# Patient Record
Sex: MALE | Race: WHITE | NOT HISPANIC OR LATINO | Employment: OTHER | URBAN - METROPOLITAN AREA
[De-identification: names, ages, dates, MRNs, and addresses within clinical notes are randomized per-mention and may not be internally consistent; named-entity substitution may affect disease eponyms.]

---

## 2017-01-17 ENCOUNTER — GENERIC CONVERSION - ENCOUNTER (OUTPATIENT)
Dept: OTHER | Facility: OTHER | Age: 62
End: 2017-01-17

## 2017-01-17 ENCOUNTER — ALLSCRIPTS OFFICE VISIT (OUTPATIENT)
Dept: OTHER | Facility: OTHER | Age: 62
End: 2017-01-17

## 2017-01-17 ENCOUNTER — ALLSCRIPTS OFFICE VISIT (OUTPATIENT)
Dept: RADIOLOGY | Facility: CLINIC | Age: 62
End: 2017-01-17
Payer: OTHER MISCELLANEOUS

## 2017-01-17 ENCOUNTER — TRANSCRIBE ORDERS (OUTPATIENT)
Dept: LAB | Facility: CLINIC | Age: 62
End: 2017-01-17

## 2017-01-17 ENCOUNTER — APPOINTMENT (OUTPATIENT)
Dept: LAB | Facility: CLINIC | Age: 62
End: 2017-01-17
Payer: MEDICARE

## 2017-01-17 DIAGNOSIS — F41.9 ANXIETY DISORDER: ICD-10-CM

## 2017-01-17 DIAGNOSIS — B34.9 VIRAL INFECTION: ICD-10-CM

## 2017-01-17 LAB
ALBUMIN SERPL BCP-MCNC: 4 G/DL (ref 3.5–5)
ALP SERPL-CCNC: 63 U/L (ref 46–116)
ALT SERPL W P-5'-P-CCNC: 35 U/L (ref 12–78)
ANION GAP SERPL CALCULATED.3IONS-SCNC: 4 MMOL/L (ref 4–13)
AST SERPL W P-5'-P-CCNC: 22 U/L (ref 5–45)
BASOPHILS # BLD AUTO: 0.02 THOUSANDS/ΜL (ref 0–0.1)
BASOPHILS NFR BLD AUTO: 0 % (ref 0–1)
BILIRUB SERPL-MCNC: 1.01 MG/DL (ref 0.2–1)
BILIRUB UR QL STRIP: NEGATIVE
BUN SERPL-MCNC: 13 MG/DL (ref 5–25)
CALCIUM SERPL-MCNC: 9 MG/DL (ref 8.3–10.1)
CHLORIDE SERPL-SCNC: 100 MMOL/L (ref 100–108)
CLARITY UR: CLEAR
CO2 SERPL-SCNC: 33 MMOL/L (ref 21–32)
COLOR UR: YELLOW
CREAT SERPL-MCNC: 0.92 MG/DL (ref 0.6–1.3)
EOSINOPHIL # BLD AUTO: 0.04 THOUSAND/ΜL (ref 0–0.61)
EOSINOPHIL NFR BLD AUTO: 1 % (ref 0–6)
ERYTHROCYTE [DISTWIDTH] IN BLOOD BY AUTOMATED COUNT: 13.3 % (ref 11.6–15.1)
GFR SERPL CREATININE-BSD FRML MDRD: >60 ML/MIN/1.73SQ M
GLUCOSE SERPL-MCNC: 97 MG/DL (ref 65–140)
GLUCOSE UR STRIP-MCNC: NEGATIVE MG/DL
HCT VFR BLD AUTO: 50.2 % (ref 36.5–49.3)
HGB BLD-MCNC: 17.6 G/DL (ref 12–17)
HGB UR QL STRIP.AUTO: NEGATIVE
KETONES UR STRIP-MCNC: NEGATIVE MG/DL
LEUKOCYTE ESTERASE UR QL STRIP: NEGATIVE
LYMPHOCYTES # BLD AUTO: 0.95 THOUSANDS/ΜL (ref 0.6–4.47)
LYMPHOCYTES NFR BLD AUTO: 21 % (ref 14–44)
MCH RBC QN AUTO: 30.7 PG (ref 26.8–34.3)
MCHC RBC AUTO-ENTMCNC: 35.1 G/DL (ref 31.4–37.4)
MCV RBC AUTO: 88 FL (ref 82–98)
MONOCYTES # BLD AUTO: 0.37 THOUSAND/ΜL (ref 0.17–1.22)
MONOCYTES NFR BLD AUTO: 8 % (ref 4–12)
NEUTROPHILS # BLD AUTO: 3.15 THOUSANDS/ΜL (ref 1.85–7.62)
NEUTS SEG NFR BLD AUTO: 70 % (ref 43–75)
NITRITE UR QL STRIP: NEGATIVE
NRBC BLD AUTO-RTO: 0 /100 WBCS
PH UR STRIP.AUTO: 6.5 [PH] (ref 4.5–8)
PLATELET # BLD AUTO: 196 THOUSANDS/UL (ref 149–390)
PMV BLD AUTO: 10.8 FL (ref 8.9–12.7)
POTASSIUM SERPL-SCNC: 4 MMOL/L (ref 3.5–5.3)
PROT SERPL-MCNC: 7.5 G/DL (ref 6.4–8.2)
PROT UR STRIP-MCNC: NEGATIVE MG/DL
RBC # BLD AUTO: 5.73 MILLION/UL (ref 3.88–5.62)
SODIUM SERPL-SCNC: 137 MMOL/L (ref 136–145)
SP GR UR STRIP.AUTO: 1.01 (ref 1–1.03)
TSH SERPL DL<=0.05 MIU/L-ACNC: 0.65 UIU/ML (ref 0.36–3.74)
UROBILINOGEN UR QL STRIP.AUTO: 0.2 E.U./DL
WBC # BLD AUTO: 4.53 THOUSAND/UL (ref 4.31–10.16)

## 2017-01-17 PROCEDURE — 85025 COMPLETE CBC W/AUTO DIFF WBC: CPT

## 2017-01-17 PROCEDURE — 80053 COMPREHEN METABOLIC PANEL: CPT

## 2017-01-17 PROCEDURE — 81003 URINALYSIS AUTO W/O SCOPE: CPT

## 2017-01-17 PROCEDURE — 84443 ASSAY THYROID STIM HORMONE: CPT

## 2017-01-17 PROCEDURE — 36415 COLL VENOUS BLD VENIPUNCTURE: CPT

## 2017-02-06 ENCOUNTER — GENERIC CONVERSION - ENCOUNTER (OUTPATIENT)
Dept: OTHER | Facility: OTHER | Age: 62
End: 2017-02-06

## 2017-02-07 ENCOUNTER — GENERIC CONVERSION - ENCOUNTER (OUTPATIENT)
Dept: OTHER | Facility: OTHER | Age: 62
End: 2017-02-07

## 2017-02-14 ENCOUNTER — GENERIC CONVERSION - ENCOUNTER (OUTPATIENT)
Dept: OTHER | Facility: OTHER | Age: 62
End: 2017-02-14

## 2017-02-17 ENCOUNTER — GENERIC CONVERSION - ENCOUNTER (OUTPATIENT)
Dept: OTHER | Facility: OTHER | Age: 62
End: 2017-02-17

## 2017-03-09 ENCOUNTER — ALLSCRIPTS OFFICE VISIT (OUTPATIENT)
Dept: RADIOLOGY | Facility: CLINIC | Age: 62
End: 2017-03-09
Payer: OTHER MISCELLANEOUS

## 2017-03-10 ENCOUNTER — GENERIC CONVERSION - ENCOUNTER (OUTPATIENT)
Dept: OTHER | Facility: OTHER | Age: 62
End: 2017-03-10

## 2017-03-23 ENCOUNTER — ALLSCRIPTS OFFICE VISIT (OUTPATIENT)
Dept: RADIOLOGY | Facility: CLINIC | Age: 62
End: 2017-03-23
Payer: OTHER MISCELLANEOUS

## 2017-03-24 ENCOUNTER — GENERIC CONVERSION - ENCOUNTER (OUTPATIENT)
Dept: OTHER | Facility: OTHER | Age: 62
End: 2017-03-24

## 2017-04-19 ENCOUNTER — ALLSCRIPTS OFFICE VISIT (OUTPATIENT)
Dept: OTHER | Facility: OTHER | Age: 62
End: 2017-04-19

## 2017-04-28 DIAGNOSIS — M12.88 OTHER SPECIFIC ARTHROPATHIES, NOT ELSEWHERE CLASSIFIED, OTHER SPECIFIED SITE: ICD-10-CM

## 2017-04-28 DIAGNOSIS — M54.16 RADICULOPATHY OF LUMBAR REGION: ICD-10-CM

## 2017-04-28 DIAGNOSIS — M54.50 LOW BACK PAIN: ICD-10-CM

## 2017-04-28 DIAGNOSIS — M25.562 PAIN IN LEFT KNEE: ICD-10-CM

## 2017-04-28 DIAGNOSIS — M54.6 PAIN IN THORACIC SPINE: ICD-10-CM

## 2017-05-05 ENCOUNTER — ALLSCRIPTS OFFICE VISIT (OUTPATIENT)
Dept: OTHER | Facility: OTHER | Age: 62
End: 2017-05-05

## 2017-05-13 ENCOUNTER — TRANSCRIBE ORDERS (OUTPATIENT)
Dept: ADMINISTRATIVE | Facility: HOSPITAL | Age: 62
End: 2017-05-13

## 2017-05-13 DIAGNOSIS — M54.16 LUMBAR RADICULOPATHY: ICD-10-CM

## 2017-05-13 DIAGNOSIS — M12.88 TRANSIENT ARTHROPATHY, OTHER SPECIFIED SITE: Primary | ICD-10-CM

## 2017-05-19 ENCOUNTER — HOSPITAL ENCOUNTER (OUTPATIENT)
Dept: RADIOLOGY | Facility: CLINIC | Age: 62
Discharge: HOME | End: 2017-05-19
Payer: MEDICARE

## 2017-05-19 ENCOUNTER — HOSPITAL ENCOUNTER (OUTPATIENT)
Dept: CT IMAGING | Facility: CLINIC | Age: 62
Discharge: HOME | End: 2017-05-19
Payer: MEDICARE

## 2017-05-19 DIAGNOSIS — M54.6 PAIN IN THORACIC SPINE: ICD-10-CM

## 2017-05-19 DIAGNOSIS — M12.88 TRANSIENT ARTHROPATHY, OTHER SPECIFIED SITE: ICD-10-CM

## 2017-05-19 DIAGNOSIS — M54.16 LUMBAR RADICULOPATHY: ICD-10-CM

## 2017-05-19 DIAGNOSIS — M25.562 PAIN IN LEFT KNEE: ICD-10-CM

## 2017-05-19 PROCEDURE — 73564 X-RAY EXAM KNEE 4 OR MORE: CPT

## 2017-05-19 PROCEDURE — 72131 CT LUMBAR SPINE W/O DYE: CPT

## 2017-05-19 PROCEDURE — 72128 CT CHEST SPINE W/O DYE: CPT

## 2017-06-12 ENCOUNTER — APPOINTMENT (OUTPATIENT)
Dept: LAB | Facility: CLINIC | Age: 62
End: 2017-06-12
Payer: MEDICARE

## 2017-06-12 ENCOUNTER — TRANSCRIBE ORDERS (OUTPATIENT)
Dept: LAB | Facility: CLINIC | Age: 62
End: 2017-06-12

## 2017-06-12 DIAGNOSIS — A69.20 LYME DISEASE: Primary | ICD-10-CM

## 2017-06-12 DIAGNOSIS — R53.83 FATIGUE DUE TO DEPRESSION: ICD-10-CM

## 2017-06-12 DIAGNOSIS — F32.A FATIGUE DUE TO DEPRESSION: ICD-10-CM

## 2017-06-12 LAB
BASOPHILS # BLD AUTO: 0.02 THOUSANDS/ΜL (ref 0–0.1)
BASOPHILS NFR BLD AUTO: 1 % (ref 0–1)
EOSINOPHIL # BLD AUTO: 0.04 THOUSAND/ΜL (ref 0–0.61)
EOSINOPHIL NFR BLD AUTO: 1 % (ref 0–6)
ERYTHROCYTE [DISTWIDTH] IN BLOOD BY AUTOMATED COUNT: 13.5 % (ref 11.6–15.1)
HCT VFR BLD AUTO: 47.9 % (ref 36.5–49.3)
HGB BLD-MCNC: 16.3 G/DL (ref 12–17)
LYMPHOCYTES # BLD AUTO: 1.03 THOUSANDS/ΜL (ref 0.6–4.47)
LYMPHOCYTES NFR BLD AUTO: 26 % (ref 14–44)
MCH RBC QN AUTO: 30 PG (ref 26.8–34.3)
MCHC RBC AUTO-ENTMCNC: 34 G/DL (ref 31.4–37.4)
MCV RBC AUTO: 88 FL (ref 82–98)
MONOCYTES # BLD AUTO: 0.32 THOUSAND/ΜL (ref 0.17–1.22)
MONOCYTES NFR BLD AUTO: 8 % (ref 4–12)
NEUTROPHILS # BLD AUTO: 2.52 THOUSANDS/ΜL (ref 1.85–7.62)
NEUTS SEG NFR BLD AUTO: 64 % (ref 43–75)
NRBC BLD AUTO-RTO: 0 /100 WBCS
PLATELET # BLD AUTO: 166 THOUSANDS/UL (ref 149–390)
PMV BLD AUTO: 10.8 FL (ref 8.9–12.7)
RBC # BLD AUTO: 5.43 MILLION/UL (ref 3.88–5.62)
WBC # BLD AUTO: 3.93 THOUSAND/UL (ref 4.31–10.16)

## 2017-06-12 PROCEDURE — 36415 COLL VENOUS BLD VENIPUNCTURE: CPT

## 2017-06-12 PROCEDURE — 85025 COMPLETE CBC W/AUTO DIFF WBC: CPT

## 2017-06-12 PROCEDURE — 86618 LYME DISEASE ANTIBODY: CPT

## 2017-06-14 LAB
B BURGDOR IGG SER IA-ACNC: 0.1
B BURGDOR IGM SER IA-ACNC: 0.13

## 2017-06-19 ENCOUNTER — ALLSCRIPTS OFFICE VISIT (OUTPATIENT)
Dept: OTHER | Facility: OTHER | Age: 62
End: 2017-06-19

## 2017-06-19 DIAGNOSIS — M54.16 RADICULOPATHY OF LUMBAR REGION: ICD-10-CM

## 2017-06-19 DIAGNOSIS — M12.88 OTHER SPECIFIC ARTHROPATHIES, NOT ELSEWHERE CLASSIFIED, OTHER SPECIFIED SITE: ICD-10-CM

## 2017-06-19 DIAGNOSIS — M54.6 PAIN IN THORACIC SPINE: ICD-10-CM

## 2017-06-19 DIAGNOSIS — S16.1XXA STRAIN OF MUSCLE, FASCIA AND TENDON AT NECK LEVEL, INITIAL ENCOUNTER: ICD-10-CM

## 2017-07-11 ENCOUNTER — GENERIC CONVERSION - ENCOUNTER (OUTPATIENT)
Dept: OTHER | Facility: OTHER | Age: 62
End: 2017-07-11

## 2017-07-18 ENCOUNTER — GENERIC CONVERSION - ENCOUNTER (OUTPATIENT)
Dept: OTHER | Facility: OTHER | Age: 62
End: 2017-07-18

## 2017-07-19 ENCOUNTER — GENERIC CONVERSION - ENCOUNTER (OUTPATIENT)
Dept: OTHER | Facility: OTHER | Age: 62
End: 2017-07-19

## 2017-08-11 ENCOUNTER — GENERIC CONVERSION - ENCOUNTER (OUTPATIENT)
Dept: OTHER | Facility: OTHER | Age: 62
End: 2017-08-11

## 2017-09-14 ENCOUNTER — ALLSCRIPTS OFFICE VISIT (OUTPATIENT)
Dept: OTHER | Facility: OTHER | Age: 62
End: 2017-09-14

## 2017-09-21 DIAGNOSIS — M54.16 RADICULOPATHY OF LUMBAR REGION: ICD-10-CM

## 2017-09-21 DIAGNOSIS — M12.88 OTHER SPECIFIC ARTHROPATHIES, NOT ELSEWHERE CLASSIFIED, OTHER SPECIFIED SITE: ICD-10-CM

## 2017-09-28 ENCOUNTER — GENERIC CONVERSION - ENCOUNTER (OUTPATIENT)
Dept: OTHER | Facility: OTHER | Age: 62
End: 2017-09-28

## 2018-01-08 ENCOUNTER — GENERIC CONVERSION - ENCOUNTER (OUTPATIENT)
Dept: INTERNAL MEDICINE CLINIC | Facility: CLINIC | Age: 63
End: 2018-01-08

## 2018-01-10 NOTE — MISCELLANEOUS
Message   Recorded as Task   Date: 07/14/2017 03:15 PM, Created By: Caleb Spivey   Task Name: Miscellaneous   Assigned To: [facility] clinical,Team   Regarding Patient: Rajinder Huff, Status: In Progress   Comment:    Aubree Alexander - 14 Jul 2017 3:15 PM     TASK CREATED  phone call from patient requesting that additional visits be approved for his PT, patient states [facility] sent over a request  please call patient at [phone]  Renetta Alford - 14 Jul 2017 3:55 PM     TASK REASSIGNED: Previously Assigned To [facility] clinical,Team  Please advise  Huong Fountain - 17 Jul 2017 8:29 PM     TASK REPLIED TO: Previously Assigned To Huong Fountain  can we please re-fax another script for PT   Renetta Alford - 18 Jul 2017 8:12 AM     TASK EDITED  Can you put the PT order in allscripts for me so I can fax it  Huong Fountain - 18 Jul 2017 2:01 PM     TASK REPLIED TO: Previously Assigned To Huong Fountain  order in system thank you  Renetta Alford - 18 Jul 2017 3:01 PM     TASK IN PROGRESS   Renetta Alford - 18 Jul 2017 3:09 PM     TASK EDITED  S/W pt and informed him we will be faxing PT order to [facility] today  Pt provided ph # for [facility], [phone]  PT order faxed to [facility] at [phone]  Active Problems    1  Acute sinusitis (461 9) (J01 90)   2  Anxiety (300 00) (F41 9)   3  Backache (724 5) (M54 9)   4  Cerumen impaction (380 4) (H61 20)   5  Cervical strain (847 0) (S16 1XXA)   6  Chronic bilateral thoracic back pain (724 1,338 29) (M54 6,G89 29)   7  Chronic pain syndrome (338 4) (G89 4)   8  Colon cancer screening (V76 51) (Z12 11)   9  Cyst   10  Disc degeneration, lumbar (722 52) (M51 36)   11  Dysuria (788 1) (R30 0)   12  Encounter for prostate cancer screening (V76 44) (Z12 5)   13  Encounter for removal of staples (V58 32) (Z48 02)   14  Encounter for screening for HIV (V73 89) (Z11 4)   15   Failed back syndrome (288 51) (M96 1)   16  Fatigue (780 79) (R53 83)   17  Flu vaccine need (V04 81) (Z23)   18  Left knee pain (719 46) (M25 562)   19  Limb pain (729 5) (M79 609)   20  Lumbago (724 2) (M54 5)   21  Lumbar facet arthropathy (721 3) (M12 88)   22  Lumbar radiculopathy (724 4) (M54 16)   23  Lyme disease (088 81) (A69 20)   24  Need for zoster vaccine (V04 89) (Z23)   25  Other specified aftercare following surgery (V58 49) (Z48 89)   26  Pain syndrome, chronic (338 4) (G89 4)   27  Postprocedural state (V45 89) (Z98 890)   28  Pre-operative cardiovascular examination (V72 81) (Z01 810)   29  Preoperative clearance (V72 84) (Z01 818)   30  Pre-operative laboratory examination (V72 63) (Z01 812)   31  Pre-procedural examination (V72 84) (Z01 818)   32  Right knee pain (719 46) (M25 561)   33  Screening for cardiovascular condition (V81 2) (Z13 6)   34  Screening for diabetes mellitus (V77 1) (Z13 1)   35  Secondary polycythemia (289 0) (D75 1)   36  Skin rash (782 1) (R21)   37  Spondylosis of lumbar region without myelopathy or radiculopathy (721 3) (M47 816)   38  Testicular hypogonadism (257 2) (E29 1)   39  Urine frequency (788 41) (R35 0)   40  Viral illness (079 99) (B34 9)    Current Meds   1  ALPRAZolam 1 MG Oral Tablet; TAKE 1 TABLET 3 times daily PRN; Therapy: 03KFA9005 to Recorded   2  Aspir-Low 81 MG Oral Tablet Delayed Release; TAKE 1 TABLET DAILY; Therapy: (Recorded:12Zau8487) to Recorded   3  Back Support M/L Miscellaneous; Therapy: (Recorded:25Yuq6279) to Recorded   4  BuPROPion HCl ER (SR) 100 MG Oral Tablet Extended Release 12 Hour; TAKE 3   TABLET Every morning; Therapy: 31TKN8091 to (Evaluate:29Nov2014) Recorded   5  Fish Oil 1000 MG Oral Capsule; TAKE 2 CAPSULE Daily; Therapy: (Recorded:12Nov2015) to Recorded   6  MS Contin 100 MG Oral Tablet Extended Release (Morphine Sulfate ER); 2 tabs in AM   and 2 tabs at Bedtime; Therapy: (Recorded:14Apr2016) to Recorded   7   Roxicodone 30 MG Oral Tablet (OxyCODONE HCl); Take 1 tablet 3-4 times daily; Therapy: (Recorded:14Ckh1651) to Recorded   8  SM Vitamin C 500 MG Oral Tablet; Therapy: (Recorded:51Irk6343) to Recorded   9  Testosterone Cypionate 200 MG/ML Intramuscular Solution; 0 5 mL every 7 days; Therapy: (Recorded:85Qqe5203) to Recorded   10  TiZANidine HCl - 4 MG Oral Tablet; take 1 tablet daily prn; Therapy: 07WKM7720 to (Evaluate:36Bsi8043) Recorded   11  Viagra 100 MG Oral Tablet; TAKE 1 TABLET DAILY 1 HOUR BEFORE NEEDED; Therapy: 09PPE1193 to Recorded   12  Zostavax 60771 UNT/0 65ML Subcutaneous Solution Reconstituted (Zostavax 69412    UNT/0 65ML Subcutaneous Solution Reconstituted); Inject one dose; Therapy: 93EKF3758 to (Last Rx:18Nov2016)  Requested for: 07HJB8929 Ordered    Allergies    1  Lyrica CAPS    2  Nuts   3  Animal dander - Cats   4  Dust   5   Mold    Signatures   Electronically signed by : Lang Schirmer, ; Jul 18 2017  3:09PM EST                       (Author)

## 2018-01-10 NOTE — MISCELLANEOUS
Message  As requested by the patient, gave a prescription for physical therapy        Plan  Cervical strain, Failed back syndrome    · *1 - [facility]  Consult  Status: Hold For - Scheduling   Requested for: 25UHK1565  Care Summary provided  : Yes    Signatures   Electronically signed by : Greyson Crook MD; Gil  3 2016  4:25PM EST                       (Author)

## 2018-01-10 NOTE — RESULT NOTES
Message   Recorded as Task   Date: 03/24/2017 10:53 AM, Created By: Cait Garces   Task Name: Follow Up   Assigned To: Yuliya Jacobsen   Regarding Patient: Tabby LI, Status: Active   Comment:    Yuliya Jacobsen - 24 Mar 2017 10:53 AM     TASK CREATED  F/u procedure-R L4-L5 RFA on 3/23/17  Pt had a "great" night and feels "wonderful" today  Only rates his back pain as a 1-2/10  Site is clean and dry with no s/s of infection  No fever  No sunburn like sensation  Already has a 4 wk f/u and will call if he develops any problems     Gustavo Mcclain - 24 Mar 2017 11:18 AM     TASK REPLIED TO: Previously Assigned To Gustavo monae md aware        Signatures   Electronically signed by : Jasen Zafar, ; Mar 24 2017  1:34PM EST                       (Author)

## 2018-01-10 NOTE — MISCELLANEOUS
Message   Recorded as Task   Date: 03/10/2017 12:37 PM, Created By: Caleb Figueroa   Task Name: Follow Up   Assigned To: Yuliya Jacobsen   Regarding Patient: Robert LI, Status: Active   Comment:    Yuliya Jacobsen - 10 Mar 2017 12:37 PM     TASK CREATED  F/u procedure-R L2-L5 RFA on 3/9/17  Pt is pleased-rated his back pain as a 2/10 this am   Used ice last night and took his regular pain meds with good results  Site is clean and dry-no redness or swelling  No sunburn like sensation  No fever  Is already scheduled for his L L2-L5 RFA on 3/23/17   Huong Fountain - 10 Mar 2017 2:23 PM     TASK REPLIED TO: Previously Assigned To Huong monae md aware        Active Problems    1  Acute sinusitis (461 9) (J01 90)   2  Anxiety (300 00) (F41 9)   3  Backache (724 5) (M54 9)   4  Cerumen impaction (380 4) (H61 20)   5  Cervical strain (847 0) (S16 1XXA)   6  Colon cancer screening (V76 51) (Z12 11)   7  Cyst   8  Disc degeneration, lumbar (722 52) (M51 36)   9  Dysuria (788 1) (R30 0)   10  Encounter for prostate cancer screening (V76 44) (Z12 5)   11  Encounter for removal of staples (V58 32) (Z48 02)   12  Encounter for screening for HIV (V73 89) (Z11 4)   13  Failed back syndrome (722 80) (M96 1)   14  Fatigue (780 79) (R53 83)   15  Flu vaccine need (V04 81) (Z23)   16  Left knee pain (719 46) (M25 562)   17  Limb pain (729 5) (M79 609)   18  Lumbago (724 2) (M54 5)   19  Lumbar facet arthropathy (721 3) (M12 88)   20  Lumbar radiculopathy (724 4) (M54 16)   21  Lyme disease (088 81) (A69 20)   22  Need for zoster vaccine (V04 89) (Z23)   23  Other specified aftercare following surgery (V58 49) (Z48 89)   24  Pain syndrome, chronic (338 4) (G89 4)   25  Postprocedural state (V45 89) (Z98 890)   26  Pre-operative cardiovascular examination (V72 81) (Z01 810)   27  Preoperative clearance (V72 84) (Z01 818)   28  Pre-operative laboratory examination (V72 63) (Z01 812)   29   Pre-procedural examination (V72 84) (Z01 818)   30  Right knee pain (719 46) (M25 561)   31  Screening for cardiovascular condition (V81 2) (Z13 6)   32  Screening for diabetes mellitus (V77 1) (Z13 1)   33  Secondary polycythemia (289 0) (D75 1)   34  Skin rash (782 1) (R21)   35  Spondylosis of lumbar region without myelopathy or radiculopathy (721 3) (M47 816)   36  Testicular hypogonadism (257 2) (E29 1)   37  Urine frequency (788 41) (R35 0)   38  Viral illness (079 99) (B34 9)    Current Meds   1  ALPRAZolam 1 MG Oral Tablet; TAKE 1 TABLET 3 times daily PRN; Therapy: 85ZIX7226 to Recorded   2  Amoxicillin-Pot Clavulanate 875-125 MG Oral Tablet; TAKE 1 TABLET EVERY 12   HOURS DAILY; Therapy: 71NGB5118 to (Evaluate:27Jan2017)  Requested for: 53ICP4061; Last   Rx:17Jan2017 Ordered   3  Aspir-Low 81 MG Oral Tablet Delayed Release; TAKE 1 TABLET DAILY; Therapy: (Recorded:14Apr2016) to Recorded   4  Back Support M/L Miscellaneous; Therapy: (Recorded:14Apr2016) to Recorded   5  BuPROPion HCl ER (SR) 100 MG Oral Tablet Extended Release 12 Hour; TAKE 3   TABLET Every morning; Therapy: 35UYN7339 to (Evaluate:29Nov2014) Recorded   6  Fish Oil 1000 MG Oral Capsule; TAKE 2 CAPSULE Daily; Therapy: (Recorded:12Nov2015) to Recorded   7  MS Contin 100 MG Oral Tablet Extended Release (Morphine Sulfate ER); 2 tabs in AM   and 2 tabs at Bedtime; Therapy: (Recorded:14Apr2016) to Recorded   8  Roxicodone 30 MG Oral Tablet (OxyCODONE HCl); Take 1 tablet 3-4 times daily; Therapy: (Recorded:14Apr2016) to Recorded   9  SM Vitamin C 500 MG Oral Tablet; Therapy: (Recorded:13Dpl4792) to Recorded   10  Testosterone Cypionate 200 MG/ML Intramuscular Solution; 0 5 mL every 7 days; Therapy: (Recorded:25Vzf6077) to Recorded   11  TiZANidine HCl - 4 MG Oral Tablet; take 1 tablet daily prn; Therapy: 17QXT0752 to (Evaluate:75Zvj8400) Recorded   12  Viagra 100 MG Oral Tablet; TAKE 1 TABLET DAILY 1 HOUR BEFORE NEEDED;     Therapy: 59VUH6919 to Recorded   13  Zostavax 42559 UNT/0 65ML Subcutaneous Solution Reconstituted (Zostavax 02815    UNT/0 65ML Subcutaneous Solution Reconstituted); Inject one dose; Therapy: 55MNF9250 to (Last Rx:18Nov2016)  Requested for: 12YQS7251 Ordered    Allergies    1  Lyrica CAPS    2  Nuts   3  Animal dander - Cats   4  Dust   5   Mold    Signatures   Electronically signed by : Kd Rai, ; Mar 10 2017  2:27PM EST                       (Author)

## 2018-01-12 VITALS
HEART RATE: 84 BPM | SYSTOLIC BLOOD PRESSURE: 120 MMHG | HEIGHT: 70 IN | BODY MASS INDEX: 31.41 KG/M2 | WEIGHT: 219.38 LBS | DIASTOLIC BLOOD PRESSURE: 72 MMHG

## 2018-01-12 NOTE — RESULT NOTES
Message   Recorded as Task   Date: 01/24/2017 10:44 AM, Created By: Jorge Luis Mesa   Task Name: Follow Up   Assigned To: Mildred Serrato   Regarding Patient: Gary LI, Status: Active   Comment:    Yuliya Jacobsen - 24 Jan 2017 10:44 AM     TASK CREATED  F/u procedure-B/L L2-L5 MBB #2 on 1/17/17  Pt reports an almost 100% improvement in low back pain that lasted until 10:30pm that evening  Then pain returned and he had shooting pain down b/l legs, but worse on the R   Pain increases with prolonged sitting and he rates it a 4/10  Will drop off pain diary today  Has no f/u appoint, but is interested in RFA     Mary Morales - 25 Jan 2017 10:45 AM     TASK REPLIED TO: Previously Assigned To Mary Morales  schedule rfa   Yuliya Jacobsen - 25 Jan 2017 11:01 AM     TASK REASSIGNED: Previously Assigned To Erica Timmons - 27 Jan 2017 9:57 AM     TASK REPLIED TO: Previously Assigned To Tomeka Sabillon for pt   BrennenSusan - 31 Jan 2017 10:22 AM     TASK REPLIED TO: Previously Assigned To Tomeka Sabillon  R/C cb# [phone]   Yuliya Jacobsen - 31 Jan 2017 1:01 PM     TASK REASSIGNED: Previously Assigned To Erica Timmons - 01 Feb 2017 11:04 AM     TASK REPLIED TO: Previously Assigned To Mildred Serrato  pt is scheduled        Verified Results  (1) URINALYSIS w URINE C/S REFLEX (will reflex a microscopy if leukocytes, occult blood, or nitrites are not within normal limits) 08FEH1983 12:26PM Preethi Iraheta Order Number: [id number]     Test Name Result Flag Reference   COLOR Yellow     CLARITY Clear     PH UA 6 5  4 5-8 0   LEUKOCYTE ESTERASE UA Negative  Negative   NITRITE UA Negative  Negative   PROTEIN UA Negative mg/dl  Negative   GLUCOSE UA Negative mg/dl  Negative   KETONES UA Negative mg/dl  Negative   UROBILINOGEN UA 0 2 E U /dl  0 2, 1 0 E U /dl   BILIRUBIN UA Negative  Negative   BLOOD UA Negative  Negative   SPECIFIC GRAVITY UA 1 012  1 003-1 030     (1) TSH 73CJG7950 12:26PM Liat York Order Number: [id number]     Test Name Result Flag Reference   TSH 0 645 uIU/mL  0 358-3 740   - Patient Instructions: This bloodwork is non-fasting  Please drink two glasses of water morning of bloodwork  - Patient Instructions: This bloodwork is non-fasting  Please drink two glasses of water morning of bloodwork  Patients undergoing fluorescein dye angiography may retain small amounts of fluorescein in the body for 48-72 hours post procedure  Samples containing fluorescein can produce falsely depressed TSH values  If the patient had this procedure,a specimen should be resubmitted post fluorescein clearance  (1) COMPREHENSIVE METABOLIC PANEL 11KIU9902 49:06AG Liat York Order Number: [id number]     Test Name Result Flag Reference   GLUCOSE,RANDM 97 mg/dL     If the patient is fasting, the ADA then defines impaired fasting glucose as > 100 mg/dL and diabetes as > or equal to 123 mg/dL  SODIUM 137 mmol/L  136-145   POTASSIUM 4 0 mmol/L  3 5-5 3   CHLORIDE 100 mmol/L  100-108   CARBON DIOXIDE 33 mmol/L H 21-32   ANION GAP (CALC) 4 mmol/L  4-13   BLOOD UREA NITROGEN 13 mg/dL  5-25   CREATININE 0 92 mg/dL  0 60-1 30   Standardized to IDMS reference method   CALCIUM 9 0 mg/dL  8 3-10 1   BILI, TOTAL 1 01 mg/dL H 0 20-1 00   ALK PHOSPHATAS 63 U/L     ALT (SGPT) 35 U/L  12-78   AST(SGOT) 22 U/L  5-45   ALBUMIN 4 0 g/dL  3 5-5 0   TOTAL PROTEIN 7 5 g/dL  6 4-8 2   eGFR Non-African American      >60 0 ml/min/1 73sq m   - Patient Instructions: This bloodwork is non-fasting  Please drink two glasses of water morning of bloodwork  National Kidney Disease Education Program recommendations are as follows:  GFR calculation is accurate only with a steady state creatinine  Chronic Kidney disease less than 60 ml/min/1 73 sq  meters  Kidney failure less than 15 ml/min/1 73 sq  meters       (1) CBC/PLT/DIFF 90YVX0418 12: 26PM Poly Meier Order Number: [id number]     Test Name Result Flag Reference   WBC COUNT 4 53 Thousand/uL  4 31-10 16   RBC COUNT 5 73 Million/uL H 3 88-5 62   HEMOGLOBIN 17 6 g/dL H 12 0-17 0   HEMATOCRIT 50 2 % H 36 5-49 3   MCV 88 fL  82-98   MCH 30 7 pg  26 8-34 3   MCHC 35 1 g/dL  31 4-37 4   RDW 13 3 %  11 6-15 1   MPV 10 8 fL  8 9-12 7   PLATELET COUNT 124 Thousands/uL  149-390   nRBC AUTOMATED 0 /100 WBCs     NEUTROPHILS RELATIVE PERCENT 70 %  43-75   LYMPHOCYTES RELATIVE PERCENT 21 %  14-44   MONOCYTES RELATIVE PERCENT 8 %  4-12   EOSINOPHILS RELATIVE PERCENT 1 %  0-6   BASOPHILS RELATIVE PERCENT 0 %  0-1   NEUTROPHILS ABSOLUTE COUNT 3 15 Thousands/?L  1 85-7 62   LYMPHOCYTES ABSOLUTE COUNT 0 95 Thousands/?L  0 60-4 47   MONOCYTES ABSOLUTE COUNT 0 37 Thousand/?L  0 17-1 22   EOSINOPHILS ABSOLUTE COUNT 0 04 Thousand/?L  0 00-0 61   BASOPHILS ABSOLUTE COUNT 0 02 Thousands/?L  0 00-0 10   - Patient Instructions: This bloodwork is non-fasting  Please drink two glasses of water morning of bloodwork  - Patient Instructions: This bloodwork is non-fasting  Please drink two glasses of water morning of bloodwork         Signatures   Electronically signed by : Dayday Quinteros, ; Feb 1 2017 12:30PM EST                       (Author)

## 2018-01-13 VITALS
BODY MASS INDEX: 30.61 KG/M2 | TEMPERATURE: 97.9 F | HEART RATE: 83 BPM | DIASTOLIC BLOOD PRESSURE: 88 MMHG | WEIGHT: 216.38 LBS | OXYGEN SATURATION: 98 % | SYSTOLIC BLOOD PRESSURE: 124 MMHG

## 2018-01-14 VITALS
SYSTOLIC BLOOD PRESSURE: 118 MMHG | WEIGHT: 222.38 LBS | DIASTOLIC BLOOD PRESSURE: 74 MMHG | BODY MASS INDEX: 31.84 KG/M2 | HEART RATE: 84 BPM | HEIGHT: 70 IN

## 2018-01-14 VITALS
HEIGHT: 70 IN | WEIGHT: 207 LBS | TEMPERATURE: 98.4 F | HEART RATE: 90 BPM | RESPIRATION RATE: 14 BRPM | BODY MASS INDEX: 29.63 KG/M2 | DIASTOLIC BLOOD PRESSURE: 75 MMHG | SYSTOLIC BLOOD PRESSURE: 123 MMHG

## 2018-01-14 VITALS
DIASTOLIC BLOOD PRESSURE: 64 MMHG | SYSTOLIC BLOOD PRESSURE: 118 MMHG | HEART RATE: 80 BPM | HEIGHT: 70 IN | WEIGHT: 216.38 LBS | BODY MASS INDEX: 30.98 KG/M2

## 2018-01-17 NOTE — RESULT NOTES
Message   Recorded as Task   Date: 12/15/2016 02:44 PM, Created By: Dayday Thacker   Task Name: Follow Up   Assigned To: Erika Burton   Regarding Patient: Minor Nash, Status: Active   CommentJared Boy - 15 Dec 2016 2:44 PM     TASK CREATED  Procedure f/u  Pt had BL L3-L5 MBB #1 12/8  Pain diary received and shows several hours of >80% relief to right side  Patient reports 20% improvement to left side for same time period  Patient reports no back pain later in evening around 8PM and pain returned to previous pain level the following day  Patient anxious to schedule 2nd MBB  Ann-Marie,Yuliya - 15 Dec 2016 2:45 PM     TASK REASSIGNED: Previously Assigned To Lex Mejía - 15 Dec 2016 3:11 PM     TASK REPLIED TO: Previously Assigned To S[facility] es clinical,Team  LMOVM stated that he is extremely happy with the results of his proc     Ladan Jacobsena - 15 Dec 2016 3:48 PM     TASK REASSIGNED: Previously Assigned To SPA es Christopher Pitcher - 15 Dec 2016 3:54 PM     TASK REPLIED TO: Previously Assigned To Tomeka Sabillon   [facility] 2nd mbb   Tomeka Sabillon - 16 Dec 2016 11:24 AM     TASK REPLIED TO: Previously Assigned To Erika Burton  pt is scheduled        Signatures   Electronically signed by : Shayy Veloz, ; Dec 16 2016  2:18PM EST                       (Author)

## 2018-01-17 NOTE — MISCELLANEOUS
Dear Paris Shah : We missed you for your originally scheduled neurological followup appointment with Dr Gayle Prom  Please call at your earliest convenience to reschedule this appointment  Sincerely,     Radha Betts [address]           Electronically signed by: Enma Oliveros   Feb 6 2017  1:55PM EST Co-author

## 2018-03-15 ENCOUNTER — OFFICE VISIT (OUTPATIENT)
Dept: INTERNAL MEDICINE CLINIC | Facility: CLINIC | Age: 63
End: 2018-03-15
Payer: MEDICARE

## 2018-03-15 VITALS
HEIGHT: 70 IN | BODY MASS INDEX: 30.24 KG/M2 | HEART RATE: 80 BPM | RESPIRATION RATE: 14 BRPM | WEIGHT: 211.2 LBS | TEMPERATURE: 97 F | SYSTOLIC BLOOD PRESSURE: 118 MMHG | DIASTOLIC BLOOD PRESSURE: 80 MMHG

## 2018-03-15 DIAGNOSIS — R22.9 SUBCUTANEOUS NODULE: ICD-10-CM

## 2018-03-15 DIAGNOSIS — J30.9 ACUTE ALLERGIC RHINITIS, UNSPECIFIED SEASONALITY, UNSPECIFIED TRIGGER: Primary | ICD-10-CM

## 2018-03-15 PROCEDURE — 99214 OFFICE O/P EST MOD 30 MIN: CPT | Performed by: INTERNAL MEDICINE

## 2018-03-15 RX ORDER — SILDENAFIL 100 MG/1
1 TABLET, FILM COATED ORAL
COMMUNITY
Start: 2015-01-27 | End: 2018-06-12

## 2018-03-15 RX ORDER — ASCORBIC ACID 500 MG
TABLET ORAL
COMMUNITY
End: 2018-12-06

## 2018-03-15 RX ORDER — CHLORAL HYDRATE 500 MG
1 CAPSULE ORAL DAILY
COMMUNITY
End: 2021-12-27 | Stop reason: SDUPTHER

## 2018-03-15 RX ORDER — SILDENAFIL CITRATE 20 MG/1
TABLET ORAL
Refills: 3 | COMMUNITY
Start: 2018-03-02 | End: 2018-12-06 | Stop reason: CLARIF

## 2018-03-15 RX ORDER — BUPROPION HYDROCHLORIDE 150 MG/1
450 TABLET, EXTENDED RELEASE ORAL DAILY
COMMUNITY
Start: 2014-10-21

## 2018-03-15 RX ORDER — TIZANIDINE 4 MG/1
1 TABLET ORAL DAILY PRN
COMMUNITY
Start: 2014-02-13 | End: 2019-09-04 | Stop reason: ALTCHOICE

## 2018-03-15 RX ORDER — MORPHINE SULFATE 100 MG/1
1 TABLET ORAL 3 TIMES DAILY
COMMUNITY
End: 2018-12-06

## 2018-03-15 RX ORDER — ALPRAZOLAM 1 MG/1
1 TABLET ORAL
COMMUNITY
Start: 2014-11-18 | End: 2021-12-27

## 2018-03-15 RX ORDER — ASPIRIN 81 MG/1
1 TABLET ORAL DAILY
COMMUNITY

## 2018-03-15 RX ORDER — MELATONIN
1000 AS NEEDED
COMMUNITY

## 2018-03-15 RX ORDER — TESTOSTERONE CYPIONATE 200 MG/ML
INJECTION INTRAMUSCULAR
COMMUNITY

## 2018-03-15 RX ORDER — OXYCODONE HYDROCHLORIDE 30 MG/1
30 TABLET ORAL 2 TIMES DAILY
COMMUNITY

## 2018-03-15 NOTE — PROGRESS NOTES
Assessment/Plan:    No problem-specific Assessment & Plan notes found for this encounter  Diagnoses and all orders for this visit:    Acute allergic rhinitis, unspecified seasonality, unspecified trigger    Subcutaneous nodule    Other orders  -     ALPRAZolam (XANAX) 1 mg tablet; Take 1 tablet by mouth 3 (three) times a day  -     aspirin (ASPIR-LOW) 81 mg EC tablet; Take 1 tablet by mouth daily  -     buPROPion (WELLBUTRIN SR) 150 mg 12 hr tablet; Take by mouth  -     Omega-3 Fatty Acids (FISH OIL) 1,000 mg; Take 2 capsules by mouth daily  -     morphine (MS CONTIN) 100 mg 12 hr tablet; Take 1 tablet by mouth 3 (three) times a day  -     oxyCODONE (ROXICODONE) 30 MG immediate release tablet; Take by mouth  -     sildenafil (REVATIO) 20 mg tablet; USE upto FIVE tablets AS NEEDED  -     ascorbic acid (V-R VITAMIN C) 500 MG tablet; Take by mouth  -     testosterone cypionate (DEPO-TESTOSTERONE) 200 mg/mL SOLN; Inject into the shoulder, thigh, or buttocks  -     tiZANidine (ZANAFLEX) 4 mg tablet; Take 1 tablet by mouth daily as needed  -     sildenafil (VIAGRA) 100 mg tablet; Take 1 tablet by mouth  -     cholecalciferol (VITAMIN D3) 1,000 units tablet; Take 1,000 Units by mouth daily        Patient Instructions    Cough and postnasal drip are likely multifactorial, there may have been acute viral infection, there is evidence of allergic rhinitis with cobblestoning of the nares, also contributing is the dry air  I recommend nasal saline irrigation, home humidification with a goal of 45% in the bed room and sitting room, over-the-counter antihistamine such as Claritin or Allegra along with nasal steroid such as Flonase as needed  Contact me if symptoms worsen or fail to improve with these measures  Subcutaneous nodular density on right medial shin may be post traumatic subcutaneous bruising versus cyst versus other  I recommend monitoring for any increase in growth, tenderness, erythema or fluctuance    No indication for acute treatment or further workup at this time  Subjective:      Patient ID: Kennedy Tavarez is a 61 y o  male  Cristy Garcia is here for multiple concerns    Started w/ cough last week, only at night  Cough better, but still w/ some nasal congestion and post nasal drip  Just started humidifying, has electric fireplace in bedroom  Noted tender red lump on R shin  No f/c  Doesn't recall injury  The following portions of the patient's history were reviewed and updated as appropriate: allergies, current medications, past family history, past medical history, past social history, past surgical history and problem list     Review of Systems   Constitutional: Negative for appetite change, chills, diaphoresis, fatigue, fever and unexpected weight change  HENT: Positive for postnasal drip and rhinorrhea  Negative for congestion and hearing loss  Eyes: Negative for visual disturbance  Respiratory: Positive for cough  Negative for chest tightness, shortness of breath and wheezing  Cardiovascular: Negative for chest pain, palpitations and leg swelling  Gastrointestinal: Negative for abdominal pain and blood in stool  Endocrine: Negative for cold intolerance, heat intolerance, polydipsia and polyuria  Genitourinary: Negative for difficulty urinating, dysuria, frequency and urgency  Musculoskeletal: Negative for arthralgias and myalgias  Skin: Negative for rash  Subcutaneous lump  Neurological: Negative for dizziness, weakness, light-headedness and headaches  Hematological: Does not bruise/bleed easily  Psychiatric/Behavioral: Negative for dysphoric mood and sleep disturbance           Objective:      /80 (BP Location: Left arm, Patient Position: Sitting)   Pulse 80   Temp (!) 97 °F (36 1 °C) (Tympanic)   Resp 14   Ht 5' 10" (1 778 m)   Wt 95 8 kg (211 lb 3 2 oz)   BMI 30 30 kg/m²          Physical Exam   Constitutional: He is oriented to person, place, and time  He appears well-developed and well-nourished  HENT:   Head: Normocephalic and atraumatic  Mouth/Throat: Oropharynx is clear and moist    Nares are injected and edematous with cobblestoning predominantly noted on the left septum   Eyes: Conjunctivae and EOM are normal  Pupils are equal, round, and reactive to light  No scleral icterus  Neck: Normal range of motion  Neck supple  No JVD present  No tracheal deviation present  No thyromegaly present  Cardiovascular: Normal rate, regular rhythm and intact distal pulses  Exam reveals no gallop and no friction rub  No murmur heard  Pulmonary/Chest: Effort normal and breath sounds normal  No respiratory distress  He has no wheezes  He has no rales  Abdominal: Soft  Bowel sounds are normal    Musculoskeletal: He exhibits no edema or tenderness  Lymphadenopathy:     He has no cervical adenopathy  Neurological: He is alert and oriented to person, place, and time  No cranial nerve deficit  Skin: Skin is warm and dry  No rash noted  No erythema  Medial to the right tibia lies a subcutaneous nodular density approximately 6 mm in diameter without any fluctuance or erythema but it is tender to palpate   Psychiatric: He has a normal mood and affect   His behavior is normal  Judgment and thought content normal

## 2018-03-15 NOTE — PATIENT INSTRUCTIONS
Cough and postnasal drip are likely multifactorial, there may have been acute viral infection, there is evidence of allergic rhinitis with cobblestoning of the nares, also contributing is the dry air  I recommend nasal saline irrigation, home humidification with a goal of 45% in the bed room and sitting room, over-the-counter antihistamine such as Claritin or Allegra along with nasal steroid such as Flonase as needed  Contact me if symptoms worsen or fail to improve with these measures  Subcutaneous nodular density on right medial shin may be post traumatic subcutaneous bruising versus cyst versus other  I recommend monitoring for any increase in growth, tenderness, erythema or fluctuance  No indication for acute treatment or further workup at this time

## 2018-06-12 ENCOUNTER — CONSULT (OUTPATIENT)
Dept: INTERNAL MEDICINE CLINIC | Facility: CLINIC | Age: 63
End: 2018-06-12
Payer: MEDICARE

## 2018-06-12 VITALS
DIASTOLIC BLOOD PRESSURE: 78 MMHG | SYSTOLIC BLOOD PRESSURE: 128 MMHG | BODY MASS INDEX: 31.46 KG/M2 | TEMPERATURE: 97.7 F | OXYGEN SATURATION: 98 % | HEIGHT: 68 IN | HEART RATE: 79 BPM | WEIGHT: 207.6 LBS

## 2018-06-12 DIAGNOSIS — M17.12 PRIMARY OSTEOARTHRITIS OF LEFT KNEE: ICD-10-CM

## 2018-06-12 DIAGNOSIS — Z01.818 PREOPERATIVE CLEARANCE: Primary | ICD-10-CM

## 2018-06-12 PROCEDURE — 99214 OFFICE O/P EST MOD 30 MIN: CPT | Performed by: PHYSICIAN ASSISTANT

## 2018-06-12 RX ORDER — BACLOFEN 10 MG/1
10 TABLET ORAL 3 TIMES DAILY
COMMUNITY
End: 2018-12-06

## 2018-06-12 RX ORDER — ANASTROZOLE 1 MG/1
1 TABLET ORAL 3 TIMES WEEKLY
Refills: 0 | COMMUNITY
Start: 2018-05-07 | End: 2022-06-28 | Stop reason: ALTCHOICE

## 2018-06-12 RX ORDER — IBUPROFEN 200 MG
800 TABLET ORAL
COMMUNITY

## 2018-06-12 RX ORDER — ANASTROZOLE 1 MG/1
1 TABLET ORAL
COMMUNITY
End: 2018-06-12

## 2018-06-12 RX ORDER — SILDENAFIL 100 MG/1
100 TABLET, FILM COATED ORAL AS NEEDED
COMMUNITY

## 2018-06-12 NOTE — PROGRESS NOTES
Assessment/Plan:      Preoperative clearance for left total knee replacement, date of surgery June 22, 2018, Dr Olivia Manzo, [facility]  a chest x-ray is ordered for the patient to go for preoperatively  Clearance is pending the results of the chest x-ray  The case is discussed with Dr Vicky Castellanos who agrees with the assessment and plan  Addendum:      Chest x-ray is clear, no acute cardiopulmonary findings  Patient is cleared for the planned surgery  No problem-specific Assessment & Plan notes found for this encounter  Diagnoses and all orders for this visit:    Preoperative clearance  -     XR chest pa & lateral; Future    Other orders  -     ibuprofen (MOTRIN) 200 mg tablet; 600-800 mg  TID PRN  -     anastrozole (ARIMIDEX) 1 mg tablet;   -     Discontinue: anastrozole (ARIMIDEX) 1 mg tablet; Take 1 mg by mouth  -     sildenafil (VIAGRA) 100 mg tablet; Take 100 mg by mouth  -     BD ECLIPSE SYRINGE 23G X 1" 3 ML MISC; As directed  -     baclofen 10 mg tablet; Take 10 mg by mouth Three times a day          Subjective:      Patient ID: Cayetano Ball is a 61 y o  male  Patient comes in for preoperative clearance  He will be undergoing a left total knee replacement, Dr Florentin Manzo, date of surgery June 22, 2018, [facility]  Patient had preoperative blood work as well as an EKG done as ordered by the surgeon  Labs are available in Care everywhere and are  Unremarkable  EKG is read as normal sinus rhythm,   Within normal limits  Patient feels well today and has no acute complaints  He does take Motrin as needed as well as aspirin and fish oil  Patient is advised to hold these medications including other NSAIDs as well as vitamin-D or multivitamin for 7 days prior to his surgery          The following portions of the patient's history were reviewed and updated as appropriate: allergies, current medications, past family history, past medical history, past social history, past surgical history and problem list     Review of Systems   Constitutional: Negative for chills, fatigue and fever  HENT: Negative for ear pain, postnasal drip, rhinorrhea and sore throat  Respiratory: Negative for cough, chest tightness and shortness of breath  Cardiovascular: Negative for chest pain and palpitations  Gastrointestinal: Negative for abdominal pain, diarrhea and nausea  Musculoskeletal: Positive for arthralgias           Objective:      /78   Pulse 79   Temp 97 7 °F (36 5 °C)   Ht 5' 8" (1 727 m)   Wt 94 2 kg (207 lb 9 6 oz)   SpO2 98%   BMI 31 57 kg/m²          Physical Exam

## 2018-06-13 ENCOUNTER — TELEPHONE (OUTPATIENT)
Dept: INTERNAL MEDICINE CLINIC | Facility: CLINIC | Age: 63
End: 2018-06-13

## 2018-06-14 ENCOUNTER — APPOINTMENT (OUTPATIENT)
Dept: RADIOLOGY | Facility: CLINIC | Age: 63
End: 2018-06-14
Payer: MEDICARE

## 2018-06-14 ENCOUNTER — TELEPHONE (OUTPATIENT)
Dept: INTERNAL MEDICINE CLINIC | Facility: CLINIC | Age: 63
End: 2018-06-14

## 2018-06-14 DIAGNOSIS — Z01.818 PREOPERATIVE CLEARANCE: ICD-10-CM

## 2018-06-14 PROCEDURE — 71046 X-RAY EXAM CHEST 2 VIEWS: CPT

## 2018-06-14 NOTE — TELEPHONE ENCOUNTER
FYI-I don't like the terminology "cleared for surgery"  I generally say "I see no medical contraindication to proceeding with planned surgery"  We can discuss

## 2018-06-15 ENCOUNTER — TELEPHONE (OUTPATIENT)
Dept: INTERNAL MEDICINE CLINIC | Facility: CLINIC | Age: 63
End: 2018-06-15

## 2018-06-15 NOTE — TELEPHONE ENCOUNTER
Zeeshan Shaw from [address] [place] [address] on gathering paper work for pt's upcoming surgery  They're missing his surgery clearance  Just office note that states he's cleared for surgery       YVG#[phone]  OS#[phone]

## 2018-06-15 NOTE — TELEPHONE ENCOUNTER
I do not know if I can change it now, but I will if I can  Would you like me to change the wording before we send it?

## 2018-06-15 NOTE — TELEPHONE ENCOUNTER
Don't worry about this one, just for future reference    There are medicolegal implications when documented that way, its like giving a guarantee that doesn't exist

## 2018-10-04 ENCOUNTER — OFFICE VISIT (OUTPATIENT)
Dept: INTERNAL MEDICINE CLINIC | Facility: CLINIC | Age: 63
End: 2018-10-04
Payer: MEDICARE

## 2018-10-04 VITALS
SYSTOLIC BLOOD PRESSURE: 106 MMHG | BODY MASS INDEX: 30.07 KG/M2 | WEIGHT: 198.4 LBS | OXYGEN SATURATION: 94 % | HEART RATE: 71 BPM | HEIGHT: 68 IN | DIASTOLIC BLOOD PRESSURE: 70 MMHG

## 2018-10-04 DIAGNOSIS — D75.1 SECONDARY POLYCYTHEMIA: ICD-10-CM

## 2018-10-04 DIAGNOSIS — L72.0 INCLUSION CYST: ICD-10-CM

## 2018-10-04 DIAGNOSIS — E55.9 VITAMIN D DEFICIENCY: ICD-10-CM

## 2018-10-04 DIAGNOSIS — F41.9 ANXIETY: ICD-10-CM

## 2018-10-04 DIAGNOSIS — Z13.6 SCREENING FOR CARDIOVASCULAR CONDITION: ICD-10-CM

## 2018-10-04 DIAGNOSIS — R73.01 IMPAIRED FASTING BLOOD SUGAR: ICD-10-CM

## 2018-10-04 DIAGNOSIS — M47.816 LUMBAR FACET ARTHROPATHY: Primary | ICD-10-CM

## 2018-10-04 DIAGNOSIS — E53.8 VITAMIN B 12 DEFICIENCY: ICD-10-CM

## 2018-10-04 PROBLEM — G89.29 CHRONIC BILATERAL THORACIC BACK PAIN: Status: ACTIVE | Noted: 2017-05-12

## 2018-10-04 PROBLEM — M54.6 CHRONIC BILATERAL THORACIC BACK PAIN: Status: ACTIVE | Noted: 2017-05-12

## 2018-10-04 PROCEDURE — 99213 OFFICE O/P EST LOW 20 MIN: CPT | Performed by: NURSE PRACTITIONER

## 2018-10-04 NOTE — PATIENT INSTRUCTIONS
Inclusion cyst behind right ear: No signs or symptoms of infection noted  Pt instructed to keep area clean and dry and allow warm water from shower to flow over area, pat dry and apply neosporin until area is healed  Patient instructed to call if symptoms of infection occur

## 2018-10-04 NOTE — PROGRESS NOTES
Assessment/Plan:    Patient Instructions   Inclusion cyst behind right ear: No signs or symptoms of infection noted  Pt instructed to keep area clean and dry and allow warm water from shower to flow over area, pat dry and apply neosporin until area is healed  Patient instructed to call if symptoms of infection occur  Diagnoses and all orders for this visit:    Lumbar facet arthropathy    Anxiety  -     TSH, 3rd generation; Future    Secondary polycythemia  -     CBC and differential; Future  -     Comprehensive metabolic panel; Future    Screening for cardiovascular condition  -     Lipid panel; Future    Impaired fasting blood sugar  -     Hemoglobin A1C; Future    Vitamin D deficiency  -     Vitamin D 25 hydroxy; Future    Vitamin B 12 deficiency  -     Vitamin B12; Future    Inclusion cyst         Subjective:      Patient ID: Allan Modi is a 61 y o  male    Patient presents today with 2 day history of draining bump behind right ear  He states that he pressed on the area and pus was expressed which was malodorous  He then cleansed the area with peroxide and applied neosporin  The next day, he again felt the area and his wife then inspected and saw a black head  She expressed the area which produced pus  He again cleansed with peroxide and applied neosporin  This morning he has had no pain and no drainage  He stated after he showered and allowed water to cleanse it, he felt fine  He states that he and his wife joined the [employer] and are now exercising regularly  He is quite pleased with his weight loss                 Current Outpatient Prescriptions:     ALPRAZolam (XANAX) 1 mg tablet, Take 1 tablet by mouth 3 (three) times a day, Disp: , Rfl:     anastrozole (ARIMIDEX) 1 mg tablet, , Disp: , Rfl: 0    aspirin (ASPIR-LOW) 81 mg EC tablet, Take 1 tablet by mouth daily, Disp: , Rfl:     baclofen 10 mg tablet, Take 10 mg by mouth Three times a day, Disp: , Rfl:     BD ECLIPSE SYRINGE 23G X 1" 3 ML MISC, As directed, Disp: , Rfl: 12    buPROPion (WELLBUTRIN SR) 150 mg 12 hr tablet, Take by mouth, Disp: , Rfl:     cholecalciferol (VITAMIN D3) 1,000 units tablet, Take 1,000 Units by mouth daily, Disp: , Rfl:     ibuprofen (MOTRIN) 200 mg tablet, 600-800 mg  TID PRN, Disp: , Rfl:     morphine (MS CONTIN) 100 mg 12 hr tablet, Take 1 tablet by mouth 3 (three) times a day, Disp: , Rfl:     Omega-3 Fatty Acids (FISH OIL) 1,000 mg, Take 2 capsules by mouth daily, Disp: , Rfl:     oxyCODONE (ROXICODONE) 30 MG immediate release tablet, Take by mouth, Disp: , Rfl:     sildenafil (REVATIO) 20 mg tablet, USE upto FIVE tablets AS NEEDED, Disp: , Rfl: 3    sildenafil (VIAGRA) 100 mg tablet, Take 100 mg by mouth, Disp: , Rfl:     testosterone cypionate (DEPO-TESTOSTERONE) 200 mg/mL SOLN, Inject into the shoulder, thigh, or buttocks, Disp: , Rfl:     ascorbic acid (V-R VITAMIN C) 500 MG tablet, Take by mouth, Disp: , Rfl:     tiZANidine (ZANAFLEX) 4 mg tablet, Take 1 tablet by mouth daily as needed, Disp: , Rfl:     No results found for this or any previous visit (from the past 1008 hour(s))  The following portions of the patient's history were reviewed and updated as appropriate: allergies, current medications, past family history, past medical history, past social history, past surgical history and problem list      Review of Systems   Constitutional: Negative for chills, diaphoresis and fever  HENT: Negative for ear pain (ExternalRight post article), sinus pain and sore throat  Eyes: Negative for visual disturbance  Respiratory: Negative for cough, chest tightness and shortness of breath  Cardiovascular: Negative for chest pain, palpitations and leg swelling  Genitourinary: Negative for difficulty urinating  Musculoskeletal: Negative for arthralgias and myalgias  Skin:        Draining pustule behind right ear   Neurological: Negative for syncope, light-headedness and headaches  Psychiatric/Behavioral: Negative for dysphoric mood  Objective:      /70 (BP Location: Left arm, Patient Position: Sitting, Cuff Size: Standard)   Pulse 71   Ht 5' 8" (1 727 m)   Wt 90 kg (198 lb 6 4 oz)   SpO2 94%   BMI 30 17 kg/m²        Physical Exam   Constitutional: He is oriented to person, place, and time  He appears well-developed and well-nourished  No distress  HENT:   Head: Normocephalic  Mouth/Throat: Oropharynx is clear and moist  No oropharyngeal exudate  Eyes: Conjunctivae are normal  Right eye exhibits no discharge  Left eye exhibits no discharge  No scleral icterus  Neck: Neck supple  No JVD present  No thyromegaly present  Cardiovascular: Normal rate, regular rhythm and intact distal pulses  No murmur heard  Pulmonary/Chest: Effort normal  No respiratory distress  He has no wheezes  He has no rales  Abdominal: Soft  Musculoskeletal: He exhibits no edema or deformity  Lymphadenopathy:     He has no cervical adenopathy  Neurological: He is alert and oriented to person, place, and time  Skin: Skin is warm  Inclusion cyst located behind right external ear  No abscess or fluid collection noted  Small area of excoriation  Psychiatric: He has a normal mood and affect   Judgment normal

## 2018-11-10 LAB — HBA1C MFR BLD HPLC: 5.9 %

## 2018-11-16 ENCOUNTER — CLINICAL SUPPORT (OUTPATIENT)
Dept: INTERNAL MEDICINE CLINIC | Facility: CLINIC | Age: 63
End: 2018-11-16
Payer: MEDICARE

## 2018-11-16 DIAGNOSIS — Z23 ENCOUNTER FOR IMMUNIZATION: Primary | ICD-10-CM

## 2018-11-16 PROCEDURE — 90682 RIV4 VACC RECOMBINANT DNA IM: CPT

## 2018-11-16 PROCEDURE — G0008 ADMIN INFLUENZA VIRUS VAC: HCPCS

## 2018-12-06 ENCOUNTER — OFFICE VISIT (OUTPATIENT)
Dept: INTERNAL MEDICINE CLINIC | Facility: CLINIC | Age: 63
End: 2018-12-06
Payer: MEDICARE

## 2018-12-06 VITALS
BODY MASS INDEX: 30.13 KG/M2 | HEART RATE: 72 BPM | DIASTOLIC BLOOD PRESSURE: 80 MMHG | SYSTOLIC BLOOD PRESSURE: 124 MMHG | HEIGHT: 68 IN | WEIGHT: 198.8 LBS

## 2018-12-06 DIAGNOSIS — Z12.5 SCREENING FOR PROSTATE CANCER: ICD-10-CM

## 2018-12-06 DIAGNOSIS — F41.9 ANXIETY: ICD-10-CM

## 2018-12-06 DIAGNOSIS — E29.1 TESTICULAR HYPOGONADISM: ICD-10-CM

## 2018-12-06 DIAGNOSIS — Z12.11 SCREENING FOR COLON CANCER: ICD-10-CM

## 2018-12-06 DIAGNOSIS — M96.1 FAILED BACK SYNDROME: ICD-10-CM

## 2018-12-06 DIAGNOSIS — G89.4 CHRONIC PAIN DISORDER: ICD-10-CM

## 2018-12-06 DIAGNOSIS — R01.1 MURMUR, CARDIAC: ICD-10-CM

## 2018-12-06 DIAGNOSIS — R73.01 IMPAIRED FASTING GLUCOSE: Primary | ICD-10-CM

## 2018-12-06 PROCEDURE — 99214 OFFICE O/P EST MOD 30 MIN: CPT | Performed by: INTERNAL MEDICINE

## 2018-12-06 RX ORDER — OXYCODONE HYDROCHLORIDE 80 MG/1
40 TABLET, FILM COATED, EXTENDED RELEASE ORAL EVERY 12 HOURS SCHEDULED
COMMUNITY

## 2018-12-06 NOTE — PATIENT INSTRUCTIONS
Lab data reviewed in detail and compared prior    Blood pressure and cholesterol remained stable off of medication    Impaired fasting glucose-A1c 5 9, monitor carbohydrate intake, continue with aerobic exercise with a goal of 30 min per day, 5 days per week, continue with weight loss efforts as able    Chronic pain syndrome, failed back status post spinal cord stimulator weaning narcotics as able with pain management    Testicular hypogonadism following with endocrinology on testosterone and anastrozole, secondary polycythemia has improved/resolved    Health maintenance-PSA ordered for follow-up lab work in 6 months, screening colo guard ordered, shingles vaccine available at pharmacy    Routine follow-up after labs in 6 months, sooner as needed

## 2018-12-06 NOTE — PROGRESS NOTES
Assessment/Plan:    Patient Instructions   Lab data reviewed in detail and compared prior    Blood pressure and cholesterol remained stable off of medication    Impaired fasting glucose-A1c 5 9, monitor carbohydrate intake, continue with aerobic exercise with a goal of 30 min per day, 5 days per week, continue with weight loss efforts as able    Chronic pain syndrome, failed back status post spinal cord stimulator weaning narcotics as able with pain management    Testicular hypogonadism following with endocrinology on testosterone and anastrozole, secondary polycythemia has improved/resolved             Diagnoses and all orders for this visit:    Impaired fasting glucose  -     CBC and differential; Future  -     Comprehensive metabolic panel; Future  -     Lipid panel; Future  -     Hemoglobin A1C; Future  -     TSH, 3rd generation with Free T4 reflex; Future    Anxiety    Testicular hypogonadism    Failed back syndrome    Chronic pain disorder    Screening for prostate cancer  -     PSA, Total Screen; Future    Murmur, cardiac  -     Echo complete with contrast if indicated; Future    Other orders  -     oxyCODONE (OxyCONTIN) 80 mg 12 hr tablet; Take 80 mg by mouth every 12 (twelve) hours         Subjective:      Patient ID: Renée Robb is a 61 y o  male    Feeling generally well  Working w/ pain mngt to come down off narcotics, weaned from ms contin 400 mg per day to 80 bid oxycontin + prns  Depression stable on current rx   Started anastrozole to counteract se of testosterone under c/o endo, Dr Otto Oz  Knee better s/p tka  Walking on treadmill and using recumbant bike 2-3 days per week and feels well  No cp/sob, dizzy or lightheaded              Current Outpatient Prescriptions:     ALPRAZolam (XANAX) 1 mg tablet, Take 1 tablet by mouth 3 (three) times a day, Disp: , Rfl:     anastrozole (ARIMIDEX) 1 mg tablet, Take 1 mg by mouth every other day  , Disp: , Rfl: 0    aspirin (ASPIR-LOW) 81 mg EC tablet, Take 1 tablet by mouth daily, Disp: , Rfl:     BD ECLIPSE SYRINGE 23G X 1" 3 ML MISC, As directed, Disp: , Rfl: 12    buPROPion (WELLBUTRIN SR) 150 mg 12 hr tablet, Take 150 mg by mouth 2 (two) times a day  , Disp: , Rfl:     cholecalciferol (VITAMIN D3) 1,000 units tablet, Take 1,000 Units by mouth daily, Disp: , Rfl:     ibuprofen (MOTRIN) 200 mg tablet, 600-800 mg  TID PRN, Disp: , Rfl:     Omega-3 Fatty Acids (FISH OIL) 1,000 mg, Take 1 capsule by mouth daily  , Disp: , Rfl:     oxyCODONE (OxyCONTIN) 80 mg 12 hr tablet, Take 80 mg by mouth every 12 (twelve) hours, Disp: , Rfl:     oxyCODONE (ROXICODONE) 30 MG immediate release tablet, Take 30 mg by mouth every 4 (four) hours as needed  , Disp: , Rfl:     sildenafil (VIAGRA) 100 mg tablet, Take 100 mg by mouth as needed  , Disp: , Rfl:     testosterone cypionate (DEPO-TESTOSTERONE) 200 mg/mL SOLN, Inject into the shoulder, thigh, or buttocks, Disp: , Rfl:     tiZANidine (ZANAFLEX) 4 mg tablet, Take 1 tablet by mouth daily as needed, Disp: , Rfl:     Recent Results (from the past 1008 hour(s))   Hemoglobin A1C    Collection Time: 11/10/18 12:00 AM   Result Value Ref Range    Hemoglobin A1C 5 9        The following portions of the patient's history were reviewed and updated as appropriate: allergies, current medications, past family history, past medical history, past social history, past surgical history and problem list      Review of Systems   Constitutional: Negative for appetite change, chills, diaphoresis, fatigue, fever and unexpected weight change  HENT: Negative for congestion, hearing loss and rhinorrhea  Eyes: Negative for visual disturbance  Respiratory: Negative for cough, chest tightness, shortness of breath and wheezing  Cardiovascular: Negative for chest pain, palpitations and leg swelling  Gastrointestinal: Negative for abdominal pain and blood in stool     Endocrine: Negative for cold intolerance, heat intolerance, polydipsia and polyuria  Genitourinary: Negative for difficulty urinating, dysuria, frequency and urgency  Musculoskeletal: Positive for arthralgias and back pain  Negative for myalgias  Skin: Negative for rash  Neurological: Negative for dizziness, weakness, light-headedness and headaches  Hematological: Does not bruise/bleed easily  Psychiatric/Behavioral: Negative for dysphoric mood and sleep disturbance  Objective:      Vitals:    12/06/18 1536   BP: 124/80   Pulse: 72          Physical Exam   Constitutional: He is oriented to person, place, and time  He appears well-developed and well-nourished  HENT:   Head: Normocephalic and atraumatic  Nose: Nose normal    Mouth/Throat: Oropharynx is clear and moist    Eyes: Pupils are equal, round, and reactive to light  Conjunctivae and EOM are normal  No scleral icterus  Neck: Normal range of motion  Neck supple  No JVD present  No tracheal deviation present  No thyromegaly present  Cardiovascular: Normal rate, regular rhythm and intact distal pulses  Exam reveals no gallop and no friction rub  Murmur heard  1/6 m at mitral area   Pulmonary/Chest: Effort normal and breath sounds normal  No respiratory distress  He has no wheezes  He has no rales  Musculoskeletal: He exhibits edema  He exhibits no deformity  Trace edema   Lymphadenopathy:     He has no cervical adenopathy  Neurological: He is alert and oriented to person, place, and time  No cranial nerve deficit  Skin: Skin is warm and dry  No rash noted  No erythema  No pallor  Psychiatric: He has a normal mood and affect   His behavior is normal  Judgment and thought content normal

## 2018-12-24 ENCOUNTER — TELEPHONE (OUTPATIENT)
Dept: INTERNAL MEDICINE CLINIC | Facility: CLINIC | Age: 63
End: 2018-12-24

## 2018-12-24 NOTE — TELEPHONE ENCOUNTER
----- Message from Gume Villegas MD sent at 12/24/2018 10:21 AM EST -----  Notify-colo guard is negative

## 2019-01-16 ENCOUNTER — OFFICE VISIT (OUTPATIENT)
Dept: DERMATOLOGY | Facility: CLINIC | Age: 64
End: 2019-01-16
Payer: MEDICARE

## 2019-01-16 DIAGNOSIS — L02.211 ABSCESS OF SKIN OF ABDOMEN: ICD-10-CM

## 2019-01-16 DIAGNOSIS — Z13.89 SCREENING FOR SKIN CONDITION: Primary | ICD-10-CM

## 2019-01-16 PROCEDURE — 87070 CULTURE OTHR SPECIMN AEROBIC: CPT | Performed by: DERMATOLOGY

## 2019-01-16 PROCEDURE — 87205 SMEAR GRAM STAIN: CPT | Performed by: DERMATOLOGY

## 2019-01-16 PROCEDURE — 10061 I&D ABSCESS COMP/MULTIPLE: CPT | Performed by: DERMATOLOGY

## 2019-01-16 PROCEDURE — 99203 OFFICE O/P NEW LOW 30 MIN: CPT | Performed by: DERMATOLOGY

## 2019-01-16 RX ORDER — DOXYCYCLINE HYCLATE 100 MG/1
100 CAPSULE ORAL EVERY 12 HOURS SCHEDULED
Qty: 20 CAPSULE | Refills: 0 | Status: SHIPPED | OUTPATIENT
Start: 2019-01-16 | End: 2019-02-15

## 2019-01-16 NOTE — PATIENT INSTRUCTIONS
Skin abscess probable Staph infection rule out MRSA await results of culture will area was incised and drained at this time and hopefully will healing by secondary intention also place patient on antibiotic therapy pending results of culture  Screening for Dermatologic Disorders: Nothing else of concern noted on complete exam follow up p r n

## 2019-01-16 NOTE — PROGRESS NOTES
[facility]  [address] [facility] [address]  [phone]  [phone]     MRN: [id number] : 1955  Encounter: [id number]  Patient Information: Concha Zuleta  Chief complaint:  Inflamed area on right abdomen    History of present illness:  19-year-old male presents for concerns regarding recurrence inflamed nodules that have been intermittently treated with antibiotics present on his skin and most recently a lesion developed on Monday on his right abdomen  Past Medical History:   Diagnosis Date    Anxiety with Persistent Worry about Recurrent Panic Attacks     Depression     Osteoarthritis     Spinal cord stimulator dysfunction ([address] )      Past Surgical History:   Procedure Laterality Date    IMPLANTATION / PLACEMENT PERMANENT EPIDURAL CATHETER      Intrathecal  Dr Lidia Arambula    OTHER SURGICAL HISTORY  2014    SF: Replacement of intrathecal pain pump reservoir w/programming    REPLACEMENT TOTAL KNEE Right     [facility]/Dr Gray Briceno REPLACEMENT TOTAL KNEE Left 2018    TONSILLECTOMY       Social History   History   Alcohol Use No     History   Drug Use No     History   Smoking Status    Never Smoker   Smokeless Tobacco    Never Used     Comment: Per [employer]: Former smoker     Family History   Problem Relation Age of Onset    Lupus Mother     Heart failure Mother     Heart failure Father     Kidney failure Father         Acute    Heart disease Family     Neuropathy Family         Peripheral     Meds/Allergies   Allergies   Allergen Reactions    Molds & Smuts Swelling and Other (See Comments)     Other reaction(s): watery, itchy eyes    Pregabalin Edema    Cat Hair Extract Itching and Other (See Comments)     Other reaction(s): watery, itchy eyes    Dust Mite Extract Other (See Comments)     Other reaction(s): watery, itchy eyes       Meds:  Prior to Admission medications    Medication Sig Start Date End Date Taking?  Authorizing Provider   ALPRAZolam Michael Lions) 1 mg tablet Take 1 tablet by mouth 3 (three) times a day 11/18/14  Yes Historical Provider, MD   anastrozole (ARIMIDEX) 1 mg tablet Take 1 mg by mouth every other day   5/7/18  Yes Historical Provider, MD   aspirin (ASPIR-LOW) 81 mg EC tablet Take 1 tablet by mouth daily   Yes Historical Provider, MD   BD ECLIPSE SYRINGE 23G X 1" 3 ML MISC As directed 5/31/18  Yes Historical Provider, MD   buPROPion [facility] SR) 150 mg 12 hr tablet Take 150 mg by mouth 2 (two) times a day   10/21/14  Yes Historical Provider, MD   cholecalciferol (VITAMIN D3) 1,000 units tablet Take 1,000 Units by mouth daily   Yes Historical Provider, MD   ibuprofen (MOTRIN) 200 mg tablet 600-800 mg  TID PRN   Yes Historical Provider, MD   Omega-3 Fatty Acids (FISH OIL) 1,000 mg Take 1 capsule by mouth daily     Yes Historical Provider, MD   oxyCODONE (OxyCONTIN) 80 mg 12 hr tablet Take 80 mg by mouth every 12 (twelve) hours   Yes Historical Provider, MD   oxyCODONE (ROXICODONE) 30 MG immediate release tablet Take 30 mg by mouth every 4 (four) hours as needed     Yes Historical Provider, MD   sildenafil (VIAGRA) 100 mg tablet Take 100 mg by mouth as needed     Yes Historical Provider, MD   testosterone cypionate (DEPO-TESTOSTERONE) 200 mg/mL SOLN Inject into the shoulder, thigh, or buttocks   Yes Historical Provider, MD   tiZANidine (ZANAFLEX) 4 mg tablet Take 1 tablet by mouth daily as needed 2/13/14  Yes Historical Provider, MD       Subjective:     Review of Systems:    General: negative for - chills, fatigue, fever,  weight gain or weight loss  Psychological: negative for - anxiety, behavioral disorder, concentration difficulties, decreased libido, depression, irritability, memory difficulties, mood swings, sleep disturbances or suicidal ideation  ENT: negative for - hearing difficulties , nasal congestion, nasal discharge, oral lesions, sinus pain, sneezing, sore throat  Allergy and Immunology: negative for - hives, insect bite sensitivity,  Hematological and Lymphatic: negative for - bleeding problems, blood clots,bruising, swollen lymph nodes  Endocrine: negative for - hair pattern changes, hot flashes, malaise/lethargy, mood swings, palpitations, polydipsia/polyuria, skin changes, temperature intolerance or unexpected weight change  Respiratory: negative for - cough, hemoptysis, orthopnea, shortness of breath, or wheezing  Cardiovascular: negative for - chest pain, dyspnea on exertion, edema,  Gastrointestinal: negative for - abdominal pain, nausea/vomiting  Genito-Urinary: negative for - dysuria, incontinence, irregular/heavy menses or urinary frequency/urgency  Musculoskeletal: negative for - gait disturbance, joint pain, joint stiffness, joint swelling, muscle pain, muscular weakness  Dermatological:  As in HPI  Neurological: negative for confusion, dizziness, headaches, impaired coordination/balance, memory loss, numbness/tingling, seizures, speech problems, tremors or weakness       Objective: There were no vitals taken for this visit      Physical Exam:    General Appearance:    Alert, cooperative, no distress   Head:    Normocephalic, without obvious abnormality, atraumatic   Lymphatics:    No lymphadenopathy noted      Abdomen:   No hepatosplenomegaly   Skin:   A full skin exam was performed including scalp, head scalp, eyes, ears, nose, lips, neck, chest, axilla, abdomen, back, buttocks, bilateral upper extremities, bilateral lower extremities, hands, feet, fingers, toes, fingernails, and toenails inflamed nodule noted on the right abdomen nothing else of concern noted on exam  INCISION and DRAINAGE    Lesion(s) removed right abdomen    Risks and benefits discussed, including:bleeding,  Infection scarring and incomplete removal of the cyst or abcess  Area(s) cleaned with alcohol and anesthetized with  1cc  1% lidocaine without epinephrine in ring block fashion  An incision was made at the top of the abscess  With a #11 blade and the contents were expressed and  sent for culture  The wound was packed with iodoform gauze and covered with a bulky dressing  Complications: none  Verbal and written wound care instructions given to patient  Assessment:     1  Screening for skin condition     2  Abscess of skin of abdomen  doxycycline hyclate (VIBRAMYCIN) 100 mg capsule         Plan:   Skin abscess probable Staph infection rule out MRSA await results of culture will area was incised and drained at this time and hopefully will healing by secondary intention also place patient on antibiotic therapy pending results of culture  Screening for Dermatologic Disorders: Nothing else of concern noted on complete exam follow up ankur Smart MD  1/16/2019,10:41 AM    Portions of the record may have been created with voice recognition software   Occasional wrong word or "sound a like" substitutions may have occurred due to the inherent limitations of voice recognition software   Read the chart carefully and recognize, using context, where substitutions have occurred

## 2019-01-18 ENCOUNTER — CLINICAL SUPPORT (OUTPATIENT)
Dept: DERMATOLOGY | Facility: CLINIC | Age: 64
End: 2019-01-18

## 2019-01-18 DIAGNOSIS — L70.8 OTHER ACNE: Primary | ICD-10-CM

## 2019-01-18 LAB
BACTERIA WND AEROBE CULT: ABNORMAL
GRAM STN SPEC: ABNORMAL
GRAM STN SPEC: ABNORMAL

## 2019-01-18 PROCEDURE — 99024 POSTOP FOLLOW-UP VISIT: CPT | Performed by: DERMATOLOGY

## 2019-01-18 NOTE — PATIENT INSTRUCTIONS
With culture not showing any Staph aureus infection this is most consistent with acneiform process will continue antibiotics and let this now settle down follow-up if any further problems arise

## 2019-01-18 NOTE — PROGRESS NOTES
[address] [facility] DERMATOLOGY  [address] [place] [address]  [phone]     MRN: [id number] : 1955  Encounter: [id number]  Patient Information: Justino Sequeira    Subjective:     70-year-old male presents for follow-up for presumed Staph infection right lower abdomen culture so far has only showed a staph coagulase negative bacteria areas slightly draining     Objective: There were no vitals taken for this visit  Physical Exam:    General Appearance:    Alert, cooperative, no distress   Skin:   Previous site of the incision drainage shows a slightly draining area     Assessment:     1  Other acne           Plan: With culture not showing any Staph aureus infection this is most consistent with acneiform process will continue antibiotics and let this now settle down follow-up if any further problems arise      Prior to Admission medications    Medication Sig Start Date End Date Taking?  Authorizing Provider   ALPRAZolam [facility]) 1 mg tablet Take 1 tablet by mouth 3 (three) times a day 14   Historical Provider, MD   anastrozole (ARIMIDEX) 1 mg tablet Take 1 mg by mouth every other day   18   Historical Provider, MD   aspirin (ASPIR-LOW) 81 mg EC tablet Take 1 tablet by mouth daily    Historical Provider, MD   BD ECLIPSE SYRINGE 23G X 1" 3 ML MISC As directed 18   Historical Provider, MD   buPROPion [facility] SR) 150 mg 12 hr tablet Take 150 mg by mouth 2 (two) times a day   10/21/14   Historical Provider, MD   cholecalciferol (VITAMIN D3) 1,000 units tablet Take 1,000 Units by mouth daily    Historical Provider, MD   doxycycline hyclate (VIBRAMYCIN) 100 mg capsule Take 1 capsule (100 mg total) by mouth every 12 (twelve) hours for 30 days 1/16/19 2/15/19  Kimmy Client, MD   ibuprofen (MOTRIN) 200 mg tablet 600-800 mg  TID PRN    Historical Provider, MD   Omega-3 Fatty Acids (FISH OIL) 1,000 mg Take 1 capsule by mouth daily Historical Provider, MD   oxyCODONE (OxyCONTIN) 80 mg 12 hr tablet Take 80 mg by mouth every 12 (twelve) hours    Historical Provider, MD   oxyCODONE (ROXICODONE) 30 MG immediate release tablet Take 30 mg by mouth every 4 (four) hours as needed      Historical Provider, MD   sildenafil (VIAGRA) 100 mg tablet Take 100 mg by mouth as needed      Historical Provider, MD   testosterone cypionate (DEPO-TESTOSTERONE) 200 mg/mL SOLN Inject into the shoulder, thigh, or buttocks    Historical Provider, MD   tiZANidine (ZANAFLEX) 4 mg tablet Take 1 tablet by mouth daily as needed 2/13/14   Historical Provider, MD     Allergies   Allergen Reactions    Molds & Smuts Swelling and Other (See Comments)     Other reaction(s): watery, itchy eyes    Pregabalin Edema    Cat Hair Extract Itching and Other (See Comments)     Other reaction(s): watery, itchy eyes    Dust Mite Extract Other (See Comments)     Other reaction(s): watery, itchy eyes       Gwenette Gosselin, MD  1/18/2019,11:52 AM    Portions of the record may have been created with voice recognition software   Occasional wrong word or "sound a like" substitutions may have occurred due to the inherent limitations of voice recognition software   Read the chart carefully and recognize, using context, where substitutions have occurred

## 2019-03-15 ENCOUNTER — TELEPHONE (OUTPATIENT)
Dept: NEUROSURGERY | Facility: CLINIC | Age: 64
End: 2019-03-15

## 2019-03-15 NOTE — TELEPHONE ENCOUNTER
3/15/19 RECEIVED CALL FROM PT  HE STATES HE CANNOT CHARGE HIS [employer] STIM BECAUSE IT BURNS HIM AT THE BATTERY SITE  PT LAST SEEN BY DR Paul Vargas 9/14/2017 FOR F/U REGARDING STIM AND PAIN PUMP  PT HAD PERM MEDTRONIC STIM PLACED BY DR Paul Vargas 9/12/2014  SCHEDULED PT ROV WITH DR YESICA NORRIS [facility] 5/3/19 @ 215PM IN [place] PER PT REQUEST  ADVISED HIM TO CALL AND DISCUSS WITH [employer] RON INGRAM AS WELL

## 2019-04-09 ENCOUNTER — TRANSCRIBE ORDERS (OUTPATIENT)
Dept: PHYSICAL THERAPY | Facility: CLINIC | Age: 64
End: 2019-04-09

## 2019-04-09 ENCOUNTER — OFFICE VISIT (OUTPATIENT)
Dept: PHYSICAL THERAPY | Facility: CLINIC | Age: 64
End: 2019-04-09
Payer: OTHER MISCELLANEOUS

## 2019-04-09 DIAGNOSIS — M54.16 LUMBAR RADICULOPATHY: Primary | ICD-10-CM

## 2019-04-09 PROCEDURE — 97164 PT RE-EVAL EST PLAN CARE: CPT | Performed by: PHYSICAL THERAPIST

## 2019-04-09 PROCEDURE — 97140 MANUAL THERAPY 1/> REGIONS: CPT | Performed by: PHYSICAL THERAPIST

## 2019-04-09 PROCEDURE — 97110 THERAPEUTIC EXERCISES: CPT | Performed by: PHYSICAL THERAPIST

## 2019-04-23 ENCOUNTER — OFFICE VISIT (OUTPATIENT)
Dept: PHYSICAL THERAPY | Facility: CLINIC | Age: 64
End: 2019-04-23
Payer: OTHER MISCELLANEOUS

## 2019-04-23 DIAGNOSIS — M54.16 LUMBAR RADICULOPATHY: Primary | ICD-10-CM

## 2019-04-23 PROCEDURE — 97140 MANUAL THERAPY 1/> REGIONS: CPT | Performed by: PHYSICAL THERAPIST

## 2019-04-23 PROCEDURE — 97014 ELECTRIC STIMULATION THERAPY: CPT | Performed by: PHYSICAL THERAPIST

## 2019-04-25 ENCOUNTER — OFFICE VISIT (OUTPATIENT)
Dept: PHYSICAL THERAPY | Facility: CLINIC | Age: 64
End: 2019-04-25
Payer: OTHER MISCELLANEOUS

## 2019-04-25 DIAGNOSIS — M54.2 CERVICALGIA: Primary | ICD-10-CM

## 2019-04-25 PROCEDURE — 97014 ELECTRIC STIMULATION THERAPY: CPT | Performed by: PHYSICAL THERAPIST

## 2019-04-25 PROCEDURE — 97110 THERAPEUTIC EXERCISES: CPT | Performed by: PHYSICAL THERAPIST

## 2019-04-25 PROCEDURE — 97140 MANUAL THERAPY 1/> REGIONS: CPT | Performed by: PHYSICAL THERAPIST

## 2019-05-02 ENCOUNTER — OFFICE VISIT (OUTPATIENT)
Dept: PHYSICAL THERAPY | Facility: CLINIC | Age: 64
End: 2019-05-02
Payer: OTHER MISCELLANEOUS

## 2019-05-02 DIAGNOSIS — M54.2 CERVICALGIA: Primary | ICD-10-CM

## 2019-05-02 PROCEDURE — 97140 MANUAL THERAPY 1/> REGIONS: CPT | Performed by: PHYSICAL THERAPIST

## 2019-05-02 PROCEDURE — 97014 ELECTRIC STIMULATION THERAPY: CPT | Performed by: PHYSICAL THERAPIST

## 2019-05-03 ENCOUNTER — OFFICE VISIT (OUTPATIENT)
Dept: NEUROSURGERY | Facility: CLINIC | Age: 64
End: 2019-05-03
Payer: OTHER MISCELLANEOUS

## 2019-05-03 VITALS
DIASTOLIC BLOOD PRESSURE: 73 MMHG | TEMPERATURE: 98.4 F | HEIGHT: 68 IN | BODY MASS INDEX: 30.16 KG/M2 | SYSTOLIC BLOOD PRESSURE: 128 MMHG | RESPIRATION RATE: 16 BRPM | HEART RATE: 63 BPM | WEIGHT: 199 LBS

## 2019-05-03 DIAGNOSIS — M54.16 LUMBAR RADICULOPATHY: ICD-10-CM

## 2019-05-03 DIAGNOSIS — G89.4 CHRONIC PAIN SYNDROME: Primary | ICD-10-CM

## 2019-05-03 PROCEDURE — 99215 OFFICE O/P EST HI 40 MIN: CPT | Performed by: NEUROLOGICAL SURGERY

## 2019-05-03 RX ORDER — CEFAZOLIN SODIUM 1 G/50ML
1000 SOLUTION INTRAVENOUS ONCE
Status: CANCELLED | OUTPATIENT
Start: 2019-05-03 | End: 2019-05-03

## 2019-05-03 RX ORDER — METHOCARBAMOL 750 MG/1
500 TABLET, FILM COATED ORAL 2 TIMES DAILY
Refills: 5 | COMMUNITY
Start: 2019-04-26 | End: 2022-06-28 | Stop reason: ALTCHOICE

## 2019-05-03 RX ORDER — CHLORHEXIDINE GLUCONATE 0.12 MG/ML
15 RINSE ORAL ONCE
Status: CANCELLED | OUTPATIENT
Start: 2019-05-03 | End: 2019-05-03

## 2019-05-03 RX ORDER — FAMOTIDINE 40 MG/1
40 TABLET, FILM COATED ORAL AS NEEDED
COMMUNITY
Start: 2019-04-20

## 2019-05-07 PROBLEM — G89.4 CHRONIC PAIN SYNDROME: Status: ACTIVE | Noted: 2019-05-07

## 2019-05-07 PROBLEM — M54.16 LUMBAR RADICULOPATHY: Status: ACTIVE | Noted: 2019-05-07

## 2019-05-10 ENCOUNTER — TELEPHONE (OUTPATIENT)
Dept: NEUROSURGERY | Facility: CLINIC | Age: 64
End: 2019-05-10

## 2019-06-06 ENCOUNTER — TELEPHONE (OUTPATIENT)
Dept: INTERNAL MEDICINE CLINIC | Facility: CLINIC | Age: 64
End: 2019-06-06

## 2019-06-19 ENCOUNTER — APPOINTMENT (OUTPATIENT)
Dept: LAB | Facility: CLINIC | Age: 64
End: 2019-06-19
Payer: MEDICARE

## 2019-06-19 DIAGNOSIS — Z12.5 SCREENING FOR PROSTATE CANCER: ICD-10-CM

## 2019-06-19 DIAGNOSIS — R73.01 IMPAIRED FASTING GLUCOSE: ICD-10-CM

## 2019-06-19 LAB
ALBUMIN SERPL BCP-MCNC: 3.9 G/DL (ref 3.5–5)
ALP SERPL-CCNC: 77 U/L (ref 46–116)
ALT SERPL W P-5'-P-CCNC: 25 U/L (ref 12–78)
ANION GAP SERPL CALCULATED.3IONS-SCNC: 1 MMOL/L (ref 4–13)
AST SERPL W P-5'-P-CCNC: 21 U/L (ref 5–45)
BASOPHILS # BLD AUTO: 0.02 THOUSANDS/ΜL (ref 0–0.1)
BASOPHILS NFR BLD AUTO: 1 % (ref 0–1)
BILIRUB SERPL-MCNC: 0.62 MG/DL (ref 0.2–1)
BUN SERPL-MCNC: 19 MG/DL (ref 5–25)
CALCIUM SERPL-MCNC: 9.2 MG/DL (ref 8.3–10.1)
CHLORIDE SERPL-SCNC: 104 MMOL/L (ref 100–108)
CHOLEST SERPL-MCNC: 150 MG/DL (ref 50–200)
CO2 SERPL-SCNC: 34 MMOL/L (ref 21–32)
CREAT SERPL-MCNC: 0.94 MG/DL (ref 0.6–1.3)
EOSINOPHIL # BLD AUTO: 0.06 THOUSAND/ΜL (ref 0–0.61)
EOSINOPHIL NFR BLD AUTO: 2 % (ref 0–6)
ERYTHROCYTE [DISTWIDTH] IN BLOOD BY AUTOMATED COUNT: 13.1 % (ref 11.6–15.1)
EST. AVERAGE GLUCOSE BLD GHB EST-MCNC: 117 MG/DL
GFR SERPL CREATININE-BSD FRML MDRD: 85 ML/MIN/1.73SQ M
GLUCOSE SERPL-MCNC: 91 MG/DL (ref 65–140)
HBA1C MFR BLD: 5.7 % (ref 4.2–6.3)
HCT VFR BLD AUTO: 49.8 % (ref 36.5–49.3)
HDLC SERPL-MCNC: 42 MG/DL (ref 40–60)
HGB BLD-MCNC: 16.7 G/DL (ref 12–17)
IMM GRANULOCYTES # BLD AUTO: 0.01 THOUSAND/UL (ref 0–0.2)
IMM GRANULOCYTES NFR BLD AUTO: 0 % (ref 0–2)
LDLC SERPL CALC-MCNC: 89 MG/DL (ref 0–100)
LYMPHOCYTES # BLD AUTO: 1.19 THOUSANDS/ΜL (ref 0.6–4.47)
LYMPHOCYTES NFR BLD AUTO: 31 % (ref 14–44)
MCH RBC QN AUTO: 29.9 PG (ref 26.8–34.3)
MCHC RBC AUTO-ENTMCNC: 33.5 G/DL (ref 31.4–37.4)
MCV RBC AUTO: 89 FL (ref 82–98)
MONOCYTES # BLD AUTO: 0.36 THOUSAND/ΜL (ref 0.17–1.22)
MONOCYTES NFR BLD AUTO: 9 % (ref 4–12)
NEUTROPHILS # BLD AUTO: 2.23 THOUSANDS/ΜL (ref 1.85–7.62)
NEUTS SEG NFR BLD AUTO: 57 % (ref 43–75)
NONHDLC SERPL-MCNC: 108 MG/DL
NRBC BLD AUTO-RTO: 0 /100 WBCS
PLATELET # BLD AUTO: 191 THOUSANDS/UL (ref 149–390)
PMV BLD AUTO: 10.8 FL (ref 8.9–12.7)
POTASSIUM SERPL-SCNC: 4.7 MMOL/L (ref 3.5–5.3)
PROT SERPL-MCNC: 6.8 G/DL (ref 6.4–8.2)
PSA SERPL-MCNC: 3.3 NG/ML (ref 0–4)
RBC # BLD AUTO: 5.58 MILLION/UL (ref 3.88–5.62)
SODIUM SERPL-SCNC: 139 MMOL/L (ref 136–145)
TRIGL SERPL-MCNC: 95 MG/DL
TSH SERPL DL<=0.05 MIU/L-ACNC: 0.47 UIU/ML (ref 0.36–3.74)
WBC # BLD AUTO: 3.87 THOUSAND/UL (ref 4.31–10.16)

## 2019-06-19 PROCEDURE — 80053 COMPREHEN METABOLIC PANEL: CPT

## 2019-06-19 PROCEDURE — 84443 ASSAY THYROID STIM HORMONE: CPT

## 2019-06-19 PROCEDURE — 36415 COLL VENOUS BLD VENIPUNCTURE: CPT

## 2019-06-19 PROCEDURE — G0103 PSA SCREENING: HCPCS

## 2019-06-19 PROCEDURE — 85025 COMPLETE CBC W/AUTO DIFF WBC: CPT

## 2019-06-19 PROCEDURE — 83036 HEMOGLOBIN GLYCOSYLATED A1C: CPT

## 2019-06-19 PROCEDURE — 80061 LIPID PANEL: CPT

## 2019-06-20 ENCOUNTER — TELEPHONE (OUTPATIENT)
Dept: INTERNAL MEDICINE CLINIC | Facility: CLINIC | Age: 64
End: 2019-06-20

## 2019-06-26 ENCOUNTER — TELEPHONE (OUTPATIENT)
Dept: INTERNAL MEDICINE CLINIC | Facility: CLINIC | Age: 64
End: 2019-06-26

## 2019-07-18 ENCOUNTER — OFFICE VISIT (OUTPATIENT)
Dept: INTERNAL MEDICINE CLINIC | Facility: CLINIC | Age: 64
End: 2019-07-18
Payer: MEDICARE

## 2019-07-18 VITALS
HEART RATE: 68 BPM | DIASTOLIC BLOOD PRESSURE: 80 MMHG | SYSTOLIC BLOOD PRESSURE: 112 MMHG | HEIGHT: 68 IN | RESPIRATION RATE: 14 BRPM | WEIGHT: 180.2 LBS | BODY MASS INDEX: 27.31 KG/M2

## 2019-07-18 DIAGNOSIS — G89.4 CHRONIC PAIN SYNDROME: ICD-10-CM

## 2019-07-18 DIAGNOSIS — E29.1 TESTICULAR HYPOGONADISM: Primary | ICD-10-CM

## 2019-07-18 DIAGNOSIS — Z13.6 SCREENING FOR CARDIOVASCULAR CONDITION: ICD-10-CM

## 2019-07-18 DIAGNOSIS — F41.9 ANXIETY: ICD-10-CM

## 2019-07-18 DIAGNOSIS — R73.01 IMPAIRED FASTING GLUCOSE: ICD-10-CM

## 2019-07-18 DIAGNOSIS — D75.1 SECONDARY POLYCYTHEMIA: ICD-10-CM

## 2019-07-18 PROCEDURE — G0439 PPPS, SUBSEQ VISIT: HCPCS | Performed by: NURSE PRACTITIONER

## 2019-07-18 NOTE — PATIENT INSTRUCTIONS
General  Physical checkup patient is doing well  Has started a new weight loss and diet program and has lost a total  Of about 40 lb over the last year  Feeling well  Has history of chronic pain has a spinal stimulator which is currently not functioning but plans on having it repaired in the fall  Has an excellent exercise regimen  Is up-to-date with health maintenance  Is treated for hypogonadism by Endocrinology and labs appear stable  We will discuss pneumonia shot next year    History of impaired fasting glucose without diabetes stable with dietary changes

## 2019-07-18 NOTE — PROGRESS NOTES
Assessment/Plan:    Patient Instructions    General  Physical checkup patient is doing well  Has started a new weight loss and diet program and has lost a total  Of about 40 lb over the last year  Feeling well  Has history of chronic pain has a spinal stimulator which is currently not functioning but plans on having it repaired in the fall  Has an excellent exercise regimen  Is up-to-date with health maintenance  Is treated for hypogonadism by Endocrinology and labs appear stable  We will discuss pneumonia shot next year  History of impaired fasting glucose without diabetes stable with dietary changes         Diagnoses and all orders for this visit:    Testicular hypogonadism    Impaired fasting glucose  -     Comprehensive metabolic panel; Future  -     Hemoglobin A1C; Future    Chronic pain syndrome    Anxiety  -     TSH, 3rd generation with Free T4 reflex; Future    Secondary polycythemia  -     CBC and differential; Future    Screening for cardiovascular condition  -     Lipid panel; Future         Subjective:      Patient ID: Destiney Bellamy is a 59 y o  male     Patient has essentially cut out all process foods from his diet  And juice thing  He has had about a 40 lb weight loss over the last year quite happy with the results  He is also exercising doing recumbent bike and walking  No chest pain or shortness of breath  Is having issues with his spine stimulator is scheduled to have some it repaired in the fall  Is treated for hypogonadism with Endocrinology  Had colo card last year  No physical concerns  Depression anxiety are stable on current medication          Current Outpatient Medications:     ALPRAZolam (XANAX) 1 mg tablet, Take 1 tablet by mouth daily at bedtime as needed 1 to 2 tablets a day, Disp: , Rfl:     anastrozole (ARIMIDEX) 1 mg tablet, Take 1 mg by mouth every other day  , Disp: , Rfl: 0    aspirin (ASPIR-LOW) 81 mg EC tablet, Take 1 tablet by mouth daily, Disp: , Rfl:    BD ECLIPSE SYRINGE 23G X 1" 3 ML MISC, As directed, Disp: , Rfl: 12    buPROPion (WELLBUTRIN SR) 150 mg 12 hr tablet, Take 150 mg by mouth 2 (two) times a day  , Disp: , Rfl:     famotidine (PEPCID) 40 MG tablet, as needed , Disp: , Rfl:     ibuprofen (MOTRIN) 200 mg tablet, 600-800 mg  TID PRN, Disp: , Rfl:     methocarbamol (ROBAXIN) 750 mg tablet, Take 750 mg by mouth every 6 (six) hours as needed , Disp: , Rfl: 5    Omega-3 Fatty Acids (FISH OIL) 1,000 mg, Take 1 capsule by mouth daily  , Disp: , Rfl:     oxyCODONE (OxyCONTIN) 80 mg 12 hr tablet, Take 60 mg by mouth every 12 (twelve) hours , Disp: , Rfl:     oxyCODONE (ROXICODONE) 30 MG immediate release tablet, Take 30 mg by mouth daily at bedtime , Disp: , Rfl:     sildenafil (VIAGRA) 100 mg tablet, Take 100 mg by mouth as needed  , Disp: , Rfl:     testosterone cypionate (DEPO-TESTOSTERONE) 200 mg/mL SOLN, Inject into the shoulder, thigh, or buttocks, Disp: , Rfl:     cholecalciferol (VITAMIN D3) 1,000 units tablet, Take 1,000 Units by mouth daily, Disp: , Rfl:     tiZANidine (ZANAFLEX) 4 mg tablet, Take 1 tablet by mouth daily as needed, Disp: , Rfl:     Recent Results (from the past 1008 hour(s))   CBC and differential    Collection Time: 06/19/19  4:23 PM   Result Value Ref Range    WBC 3 87 (L) 4 31 - 10 16 Thousand/uL    RBC 5 58 3 88 - 5 62 Million/uL    Hemoglobin 16 7 12 0 - 17 0 g/dL    Hematocrit 49 8 (H) 36 5 - 49 3 %    MCV 89 82 - 98 fL    MCH 29 9 26 8 - 34 3 pg    MCHC 33 5 31 4 - 37 4 g/dL    RDW 13 1 11 6 - 15 1 %    MPV 10 8 8 9 - 12 7 fL    Platelets 675 269 - 937 Thousands/uL    nRBC 0 /100 WBCs    Neutrophils Relative 57 43 - 75 %    Immat GRANS % 0 0 - 2 %    Lymphocytes Relative 31 14 - 44 %    Monocytes Relative 9 4 - 12 %    Eosinophils Relative 2 0 - 6 %    Basophils Relative 1 0 - 1 %    Neutrophils Absolute 2 23 1 85 - 7 62 Thousands/µL    Immature Grans Absolute 0 01 0 00 - 0 20 Thousand/uL    Lymphocytes Absolute 1 19 0 60 - 4 47 Thousands/µL    Monocytes Absolute 0 36 0 17 - 1 22 Thousand/µL    Eosinophils Absolute 0 06 0 00 - 0 61 Thousand/µL    Basophils Absolute 0 02 0 00 - 0 10 Thousands/µL   Comprehensive metabolic panel    Collection Time: 06/19/19  4:23 PM   Result Value Ref Range    Sodium 139 136 - 145 mmol/L    Potassium 4 7 3 5 - 5 3 mmol/L    Chloride 104 100 - 108 mmol/L    CO2 34 (H) 21 - 32 mmol/L    ANION GAP 1 (L) 4 - 13 mmol/L    BUN 19 5 - 25 mg/dL    Creatinine 0 94 0 60 - 1 30 mg/dL    Glucose 91 65 - 140 mg/dL    Calcium 9 2 8 3 - 10 1 mg/dL    AST 21 5 - 45 U/L    ALT 25 12 - 78 U/L    Alkaline Phosphatase 77 46 - 116 U/L    Total Protein 6 8 6 4 - 8 2 g/dL    Albumin 3 9 3 5 - 5 0 g/dL    Total Bilirubin 0 62 0 20 - 1 00 mg/dL    eGFR 85 ml/min/1 73sq m   Lipid panel    Collection Time: 06/19/19  4:23 PM   Result Value Ref Range    Cholesterol 150 50 - 200 mg/dL    Triglycerides 95 <=150 mg/dL    HDL, Direct 42 40 - 60 mg/dL    LDL Calculated 89 0 - 100 mg/dL    Non-HDL-Chol (CHOL-HDL) 108 mg/dl   Hemoglobin A1C    Collection Time: 06/19/19  4:23 PM   Result Value Ref Range    Hemoglobin A1C 5 7 4 2 - 6 3 %     mg/dl   TSH, 3rd generation with Free T4 reflex    Collection Time: 06/19/19  4:23 PM   Result Value Ref Range    TSH 3RD GENERATON 0 473 0 358 - 3 740 uIU/mL   PSA, Total Screen    Collection Time: 06/19/19  4:23 PM   Result Value Ref Range    PSA 3 3 0 0 - 4 0 ng/mL       The following portions of the patient's history were reviewed and updated as appropriate: allergies, current medications, past family history, past medical history, past social history, past surgical history and problem list      Review of Systems   Constitutional: Negative for appetite change, chills, diaphoresis, fatigue, fever and unexpected weight change  HENT: Negative for postnasal drip and sneezing  Eyes: Negative for visual disturbance  Respiratory: Negative for chest tightness and shortness of breath  Cardiovascular: Negative for chest pain, palpitations and leg swelling  Gastrointestinal: Negative for abdominal pain and blood in stool  Endocrine: Negative for cold intolerance, heat intolerance, polydipsia, polyphagia and polyuria  Genitourinary: Negative for difficulty urinating, dysuria, frequency and urgency  Musculoskeletal: Negative for arthralgias and myalgias  Skin: Negative for rash and wound  Neurological: Negative for dizziness, weakness, light-headedness and headaches  Hematological: Negative for adenopathy  Psychiatric/Behavioral: Negative for confusion, dysphoric mood and sleep disturbance  The patient is not nervous/anxious  BMI Counseling: Body mass index is 27 4 kg/m²  Discussed the patient's BMI with him  The BMI is above average  BMI counseling and education was provided to the patient  Exercise recommendations include moderate aerobic physical activity for 150 minutes/week  Objective:      /80 (BP Location: Left arm, Patient Position: Sitting, Cuff Size: Standard)   Pulse 68   Resp 14   Ht 5' 8" (1 727 m)   Wt 81 7 kg (180 lb 3 2 oz)   BMI 27 40 kg/m²        Physical Exam   Constitutional: He is oriented to person, place, and time  He appears well-developed  No distress  HENT:   Head: Normocephalic and atraumatic  Nose: Nose normal    Mouth/Throat: Oropharynx is clear and moist    Eyes: Pupils are equal, round, and reactive to light  Conjunctivae and EOM are normal    Neck: Normal range of motion  Neck supple  No JVD present  No tracheal deviation present  No thyromegaly present  Cardiovascular: Normal rate, regular rhythm, normal heart sounds and intact distal pulses  Exam reveals no gallop and no friction rub  No murmur heard  Pulmonary/Chest: Effort normal and breath sounds normal  No respiratory distress  He has no wheezes  He has no rales  Abdominal: Soft  Bowel sounds are normal  He exhibits no distension  There is no tenderness  Musculoskeletal: Normal range of motion  He exhibits no edema  Lymphadenopathy:     He has no cervical adenopathy  Neurological: He is alert and oriented to person, place, and time  No cranial nerve deficit  Skin: Skin is warm and dry  No rash noted  He is not diaphoretic  Psychiatric: He has a normal mood and affect   His behavior is normal  Judgment and thought content normal

## 2019-08-06 ENCOUNTER — TRANSCRIBE ORDERS (OUTPATIENT)
Dept: ADMINISTRATIVE | Facility: HOSPITAL | Age: 64
End: 2019-08-06

## 2019-08-06 DIAGNOSIS — M54.12 CERVICAL RADICULITIS: Primary | ICD-10-CM

## 2019-08-22 ENCOUNTER — HOSPITAL ENCOUNTER (OUTPATIENT)
Dept: CT IMAGING | Facility: HOSPITAL | Age: 64
Discharge: HOME | End: 2019-08-22
Attending: PSYCHIATRY & NEUROLOGY
Payer: OTHER MISCELLANEOUS

## 2019-08-22 DIAGNOSIS — M54.12 CERVICAL RADICULITIS: ICD-10-CM

## 2019-08-22 PROCEDURE — 72125 CT NECK SPINE W/O DYE: CPT

## 2019-09-04 ENCOUNTER — OFFICE VISIT (OUTPATIENT)
Dept: NEUROSURGERY | Facility: CLINIC | Age: 64
End: 2019-09-04

## 2019-09-04 VITALS
RESPIRATION RATE: 16 BRPM | HEIGHT: 68 IN | HEART RATE: 80 BPM | TEMPERATURE: 98.5 F | SYSTOLIC BLOOD PRESSURE: 118 MMHG | BODY MASS INDEX: 27.89 KG/M2 | DIASTOLIC BLOOD PRESSURE: 64 MMHG | WEIGHT: 184 LBS

## 2019-09-04 DIAGNOSIS — T85.192A MALFUNCTION OF SPINAL CORD STIMULATOR, INITIAL ENCOUNTER (HCC): ICD-10-CM

## 2019-09-04 DIAGNOSIS — Z01.818 PREOPERATIVE EXAMINATION: ICD-10-CM

## 2019-09-04 DIAGNOSIS — Z98.890 S/P INSERTION OF INTRATHECAL PUMP: ICD-10-CM

## 2019-09-04 DIAGNOSIS — Z96.89 STATUS POST INSERTION OF SPINAL CORD STIMULATOR: ICD-10-CM

## 2019-09-04 DIAGNOSIS — G89.4 CHRONIC PAIN SYNDROME: Primary | ICD-10-CM

## 2019-09-04 PROCEDURE — PREOP: Performed by: NURSE PRACTITIONER

## 2019-09-04 RX ORDER — GABAPENTIN 300 MG/1
CAPSULE ORAL
Refills: 0 | COMMUNITY
Start: 2019-08-27 | End: 2022-06-28 | Stop reason: ALTCHOICE

## 2019-09-04 NOTE — PROGRESS NOTES
Assessment/Plan:  Diagnoses and all orders for this visit:    Chronic pain syndrome    S/P insertion of intrathecal pump    Status post insertion of spinal cord stimulator    Preoperative examination    [facility]      Subjective:   I did not see my family  yet for surgery, I forget a lot  Patient ID: Jolly Santos is a 59 y o  male  HPI    He has a long standing history of chronic pain syndrome affecting his low back and lower extremities  He had an intrathecal pump inserted  2007 and a  [employer] spinal cord stimulator  inserted in 2014  He reports the SCS was delivering approximately 60 % efficacy before he started having problems with IPG  He complains of charge burden and IPG causing left buttock pain like pressing on a nerve, pain in site during charging and burning sensation , generator not holding a charge, and device has been off for approximately 1 year secondary to malfunctioning  He reports approximately 100 LBS weight loss since SCS insertion in 2014, remarked my legs were so big back then I could not fit may hand around as I can now  Office appointment with Dr Dahiana Alarcon May 2019 to discuss improperly functioning generator  He reports Dr Dahiana Alarcon explained the surgery in detail , discussed the benefits and risks of surgery  He signed surgical with Dr Dahiana Alarcon consent and wishes to proceed with surgical intervention for IPG replacement  Assessment and Plan  He presents for preoperative H & P , is scheduled for surgery 9/20/2019 w/ Dr Merle Fregoso (IPG) DORSAL SPINAL COLUMN STIMULATOR  (DCS), LEFT BUTTOCK, REPOSITION LOWER IN BUTTOCK (Left Buttocks)  He wishes to proceed with generator replacement , SCS system delivering 60 % efficacy for relief of chronic pain       He has a SCS system since 2014 inserted by Dr Gavin Jernigan , left buttock generator is malfunctioning, burning him while charging, is not holding a charge, charging is a burdensome, and the battery is pressing on a nerve the upper lateral corner  The IPG/generator is assessed to be loosely implanted in pocket, skin  pocket is prolapsed  He reported a 100 lbs weight loss since insertion in 2017  Preoperative Assessments :  Prior General  Anesthesia Reactions:--- delirium, agitation,    Exercise Capacity ---  he denies exertional symptoms  of dyspnea or chest pain when climbing 2 flights of stairs or when walking 2 city blocks  Nicotine dependence  ----    Never smoked  Personal history of venous thromboembolic disease;denies,  reports told he has thick blood, Hct  Elevated 49 8, 47 9, 50 2, 48 5--followed by PCP   Bleeding Tendency ---denies easily bruising, nose or gums bleeds   Nickel or metal reaction/allergy: denies   History of cancer or other health condition that requires routine MRI imagining--has SCS system that is not MRI compliant  Surgery Clearance;  PCP/Medical Clearance- pending completion--- advised to schedule clearance appointment ASAP  Cardiac- EKG ordered 7/2019 ---must complete     PAT---ordered 7/2019 advised to complete labs before PCP appointment   Imagining requirement --none required for this surgical procedure    In preparation for surgery the following information is explained;  Medication exclusion  list provided and reviewed - do not take 7 days prior surgery or 14 days postoperatively including  OTC, supplements,  ASA containing  products (Excedrin migraine) and NSAID  treating with omega -3,  Asa , ibuprofen       Explained surgical risks associated with smoking poor wound healing, infection respiratory and cardiovascular adverse effects  Explained pre operative skin preparation hygiene care use of products Hibiclens (chlorhexidine) and use [employer] cloth wipes as per protocol  Explained all measures on surgery day preop to decrease infection risk    Read and review the education booklet provided by the OR  several weeks ago  Informed an antibiotic will be prescribed the day prior surgery,   start night of surgery and continue as directed   Postoperative activity restrictions were reviewed , follow the basic "BLTs" no bending, no lifting greater than 10 lbs  , no twisting, and no stretching thru 6 weeks post op  Can ambulate as tolerated immediately postop  Do not drive fr 2 week postoperatively  Avoid repetitive pushing, pulling , or rotating movement of upper extremities  Will receive postoperative discharge paperwork with care instructions for incision (s) explaining incision care and general body hygiene instructions such as when to resume  showers can resume  Postoperative pain management and postoperative opioid induced constipation and bowel hygiene measures discussed   Postoperative follow-up appointments were reviewed for 2 week and 6 week visits  Impressions and treatment recommendations were discussed in detail with the patient who verbalized understanding, all questions were answered and contact information provided in the event future questions arise  He verbalized an understanding and is in agreement with plan  The following portions of the patient's history were reviewed and updated as appropriate:   He  has a past medical history of Anxiety with Persistent Worry about Recurrent Panic Attacks, Depression, Osteoarthritis, and Spinal cord stimulator dysfunction ([facility] )    He   Patient Active Problem List    Diagnosis Date Noted    S/P insertion of intrathecal pump 09/04/2019    Status post insertion of spinal cord stimulator 09/04/2019    Spinal cord stimulator dysfunction (HCC) 09/04/2019    Chronic pain syndrome 05/07/2019    Lumbar radiculopathy 05/07/2019    Screening for skin condition 01/16/2019    Abscess of skin of abdomen 01/16/2019    Impaired fasting glucose 12/06/2018    Primary osteoarthritis of left knee 05/18/2018    Chronic bilateral thoracic back pain 05/12/2017    Chronic pain disorder 04/19/2017    Lumbar facet arthropathy 11/16/2016    Anxiety 05/19/2016    Fatigue 01/08/2016    Failed back syndrome 10/14/2015    Secondary polycythemia 04/08/2014    Testicular hypogonadism 04/08/2014     He  has a past surgical history that includes Other surgical history (04/11/2014); Implantation / placement permanent epidural catheter (2007); Tonsillectomy; Replacement total knee (Right, 2009); and Replacement total knee (Left, 06/22/2018)  His family history includes Heart disease in his family; Heart failure in his father and mother; Kidney failure in his father; Lupus in his mother; Neuropathy in his family  He  reports that he has never smoked  He has never used smokeless tobacco  He reports that he does not drink alcohol or use drugs    Current Outpatient Medications   Medication Sig Dispense Refill    ALPRAZolam (XANAX) 1 mg tablet Take 1 tablet by mouth daily at bedtime as needed 1 to 2 tablets a day      anastrozole (ARIMIDEX) 1 mg tablet Take 1 mg by mouth every other day    0    aspirin (ASPIR-LOW) 81 mg EC tablet Take 1 tablet by mouth daily      buPROPion (WELLBUTRIN SR) 150 mg 12 hr tablet Take 150 mg by mouth 2 (two) times a day        cholecalciferol (VITAMIN D3) 1,000 units tablet Take 1,000 Units by mouth as needed       famotidine (PEPCID) 40 MG tablet as needed       gabapentin (NEURONTIN) 300 mg capsule   0    ibuprofen (MOTRIN) 200 mg tablet 600-800 mg  TID PRN      methocarbamol (ROBAXIN) 750 mg tablet Take 750 mg by mouth every 6 (six) hours as needed   5    Omega-3 Fatty Acids (FISH OIL) 1,000 mg Take 1 capsule by mouth daily        oxyCODONE (OxyCONTIN) 80 mg 12 hr tablet Take 40 mg by mouth every 12 (twelve) hours       oxyCODONE (ROXICODONE) 30 MG immediate release tablet Take 30 mg by mouth daily at bedtime       BD ECLIPSE SYRINGE 23G X 1" 3 ML MISC As directed  12    sildenafil (VIAGRA) 100 mg tablet Take 100 mg by mouth as needed        testosterone cypionate (DEPO-TESTOSTERONE) 200 mg/mL SOLN Inject into the shoulder, thigh, or buttocks       No current facility-administered medications for this visit  He is allergic to molds & smuts; pregabalin; cat hair extract; and dust mite extract       Review of Systems   Constitutional: Negative  HENT: Negative  Eyes: Negative  Respiratory: Negative  Cardiovascular: Negative  Gastrointestinal: Positive for constipation (Secondary to Opiate medication )  Endocrine: Negative  Musculoskeletal: Positive for back pain (B/L LBP - Radiates down B/L legs but the radiation changes at times ) and neck pain  Skin: Negative  Allergic/Immunologic: Negative  Neurological: Positive for headaches (Secondary to neck pain )  Hematological:        Fish oil - 81 mg ASA    Psychiatric/Behavioral: The patient is nervous/anxious  All other systems reviewed and are negative          Objective:      /64 (BP Location: Left arm, Patient Position: Sitting, Cuff Size: Adult)   Pulse 80   Temp 98 5 °F (36 9 °C) (Tympanic)   Resp 16   Ht 5' 8" (1 727 m)   Wt 83 5 kg (184 lb)   BMI 27 98 kg/m²          Physical Exam

## 2019-11-12 ENCOUNTER — TRANSCRIBE ORDERS (OUTPATIENT)
Dept: PHYSICAL THERAPY | Facility: CLINIC | Age: 64
End: 2019-11-12

## 2019-11-12 DIAGNOSIS — M54.16 LUMBAR RADICULOPATHY: Primary | ICD-10-CM

## 2020-02-03 ENCOUNTER — OFFICE VISIT (OUTPATIENT)
Dept: INTERNAL MEDICINE CLINIC | Facility: CLINIC | Age: 65
End: 2020-02-03
Payer: MEDICARE

## 2020-02-03 VITALS
BODY MASS INDEX: 27.25 KG/M2 | SYSTOLIC BLOOD PRESSURE: 110 MMHG | DIASTOLIC BLOOD PRESSURE: 66 MMHG | TEMPERATURE: 97.8 F | HEIGHT: 68 IN | WEIGHT: 179.8 LBS | HEART RATE: 69 BPM | OXYGEN SATURATION: 95 %

## 2020-02-03 DIAGNOSIS — J06.9 VIRAL URI: Primary | ICD-10-CM

## 2020-02-03 PROCEDURE — 1036F TOBACCO NON-USER: CPT | Performed by: INTERNAL MEDICINE

## 2020-02-03 PROCEDURE — 99213 OFFICE O/P EST LOW 20 MIN: CPT | Performed by: INTERNAL MEDICINE

## 2020-02-03 PROCEDURE — 3008F BODY MASS INDEX DOCD: CPT | Performed by: INTERNAL MEDICINE

## 2020-02-03 NOTE — PROGRESS NOTES
Assessment/Plan:    Diagnoses and all orders for this visit:    Viral URI         Patient Instructions   Symptoms most suggestive of viral URI-continue with supportive care with plenty of rest, hydration, Mucinex DM maximal strength as needed for cough and chest congestion and pseudoephedrine 120 mg twice daily as needed for nasal congestion and postnasal drip  Contact me if symptoms fail to improve this week as expected      Subjective:      Patient ID: Renée Steward is a 59 y o  male    Patient presents for evaluation of upper respiratory infection  He started with migraine and sinus congestion approximately 9 days ago  He took some over-the-counter cold medicine in which included pseudoephedrine with significant improvement but he continues to have congestion and postnasal drip  He has intermittent fatigue  1 day he will feel as though he is getting better and then he will be dragging the next  He does feel better than he did 72 hours ago  He has been waking up sweaty but no known fever          Current Outpatient Medications:     ALPRAZolam (XANAX) 1 mg tablet, Take 1 tablet by mouth daily at bedtime as needed 1 to 2 tablets a day, Disp: , Rfl:     anastrozole (ARIMIDEX) 1 mg tablet, Take 1 mg by mouth 3 (three) times a week , Disp: , Rfl: 0    aspirin (ASPIR-LOW) 81 mg EC tablet, Take 1 tablet by mouth daily, Disp: , Rfl:     BD ECLIPSE SYRINGE 23G X 1" 3 ML MISC, As directed, Disp: , Rfl: 12    buPROPion (WELLBUTRIN SR) 150 mg 12 hr tablet, Take 150 mg by mouth 2 (two) times a day  , Disp: , Rfl:     cholecalciferol (VITAMIN D3) 1,000 units tablet, Take 1,000 Units by mouth as needed , Disp: , Rfl:     famotidine (PEPCID) 40 MG tablet, 20 mg as needed , Disp: , Rfl:     gabapentin (NEURONTIN) 300 mg capsule, , Disp: , Rfl: 0    ibuprofen (MOTRIN) 200 mg tablet, 600-800 mg  TID PRN, Disp: , Rfl:     methocarbamol (ROBAXIN) 750 mg tablet, Take 750 mg by mouth every 6 (six) hours as needed , Disp: , Rfl: 5    Omega-3 Fatty Acids (FISH OIL) 1,000 mg, Take 1 capsule by mouth daily  , Disp: , Rfl:     oxyCODONE (OxyCONTIN) 80 mg 12 hr tablet, Take 40 mg by mouth every 12 (twelve) hours , Disp: , Rfl:     oxyCODONE (ROXICODONE) 30 MG immediate release tablet, Take 30 mg by mouth daily at bedtime , Disp: , Rfl:     sildenafil (VIAGRA) 100 mg tablet, Take 100 mg by mouth as needed  , Disp: , Rfl:     testosterone cypionate (DEPO-TESTOSTERONE) 200 mg/mL SOLN, Inject into the shoulder, thigh, or buttocks, Disp: , Rfl:     No results found for this or any previous visit (from the past 1008 hour(s))  The following portions of the patient's history were reviewed and updated as appropriate: allergies, current medications, past family history, past medical history, past social history, past surgical history and problem list      Review of Systems   Constitutional: Negative for appetite change, chills, diaphoresis, fatigue, fever and unexpected weight change  HENT: Positive for postnasal drip, rhinorrhea and sinus pressure  Negative for congestion and hearing loss  Eyes: Negative for visual disturbance  Respiratory: Positive for cough  Negative for chest tightness, shortness of breath and wheezing  Cardiovascular: Negative for chest pain, palpitations and leg swelling  Gastrointestinal: Negative for abdominal pain and blood in stool  Endocrine: Negative for cold intolerance, heat intolerance, polydipsia and polyuria  Genitourinary: Negative for difficulty urinating, dysuria, frequency and urgency  Musculoskeletal: Negative for arthralgias and myalgias  Skin: Negative for rash  Neurological: Negative for dizziness, weakness, light-headedness and headaches  Hematological: Does not bruise/bleed easily  Psychiatric/Behavioral: Negative for dysphoric mood and sleep disturbance           Objective:      Vitals:    02/03/20 1711   BP: 110/66   Pulse: 69   Temp: 97 8 °F (36 6 °C)   SpO2: 95% Physical Exam   Constitutional: He is oriented to person, place, and time  He appears well-developed and well-nourished  HENT:   Head: Normocephalic and atraumatic  Nose: Nose normal    Mouth/Throat: Oropharynx is clear and moist    Eyes: Pupils are equal, round, and reactive to light  Conjunctivae and EOM are normal  No scleral icterus  Neck: Normal range of motion  Neck supple  No JVD present  No tracheal deviation present  No thyromegaly present  Cardiovascular: Normal rate, regular rhythm and intact distal pulses  Exam reveals no gallop and no friction rub  No murmur heard  Pulmonary/Chest: Effort normal and breath sounds normal  No respiratory distress  He has no wheezes  He has no rales  Musculoskeletal: He exhibits no edema or deformity  Lymphadenopathy:     He has no cervical adenopathy  Neurological: He is alert and oriented to person, place, and time  No cranial nerve deficit  Skin: Skin is warm and dry  No rash noted  No erythema  No pallor  Psychiatric: He has a normal mood and affect   His behavior is normal  Judgment and thought content normal

## 2020-02-03 NOTE — PATIENT INSTRUCTIONS
Symptoms most suggestive of viral URI-continue with supportive care with plenty of rest, hydration, Mucinex DM maximal strength as needed for cough and chest congestion and pseudoephedrine 120 mg twice daily as needed for nasal congestion and postnasal drip    Contact me if symptoms fail to improve this week as expected

## 2020-04-06 ENCOUNTER — TELEMEDICINE (OUTPATIENT)
Dept: NEUROSURGERY | Facility: CLINIC | Age: 65
End: 2020-04-06
Payer: OTHER MISCELLANEOUS

## 2020-04-06 DIAGNOSIS — G89.4 CHRONIC PAIN SYNDROME: Primary | ICD-10-CM

## 2020-04-06 DIAGNOSIS — Z46.2 END OF BATTERY LIFE OF INTRATHECAL INFUSION PUMP: ICD-10-CM

## 2020-04-06 PROCEDURE — 99214 OFFICE O/P EST MOD 30 MIN: CPT | Performed by: NEUROLOGICAL SURGERY

## 2020-04-06 RX ORDER — CHLORHEXIDINE GLUCONATE 0.12 MG/ML
15 RINSE ORAL ONCE
Status: CANCELLED | OUTPATIENT
Start: 2020-04-06 | End: 2020-04-06

## 2020-04-06 RX ORDER — CEFAZOLIN SODIUM 2 G/50ML
2000 SOLUTION INTRAVENOUS ONCE
Status: CANCELLED | OUTPATIENT
Start: 2020-04-06 | End: 2020-04-06

## 2020-04-08 ENCOUNTER — DOCUMENTATION (OUTPATIENT)
Dept: NEUROSURGERY | Facility: CLINIC | Age: 65
End: 2020-04-08

## 2020-09-10 ENCOUNTER — TELEPHONE (OUTPATIENT)
Dept: OTHER | Facility: OTHER | Age: 65
End: 2020-09-10

## 2020-09-11 NOTE — TELEPHONE ENCOUNTER
09/11/2020-CALLED PT, PT SPOKE TO LYNN PT IS SCHEDULED W/DR YESICA NORRIS [facility] ON 10/01/2020

## 2020-10-01 ENCOUNTER — OFFICE VISIT (OUTPATIENT)
Dept: NEUROSURGERY | Facility: CLINIC | Age: 65
End: 2020-10-01
Payer: OTHER MISCELLANEOUS

## 2020-10-01 VITALS
HEART RATE: 76 BPM | SYSTOLIC BLOOD PRESSURE: 108 MMHG | BODY MASS INDEX: 26.6 KG/M2 | RESPIRATION RATE: 16 BRPM | HEIGHT: 71 IN | TEMPERATURE: 97.2 F | WEIGHT: 190 LBS | DIASTOLIC BLOOD PRESSURE: 76 MMHG

## 2020-10-01 DIAGNOSIS — G89.4 CHRONIC PAIN SYNDROME: Primary | ICD-10-CM

## 2020-10-01 PROCEDURE — 99244 OFF/OP CNSLTJ NEW/EST MOD 40: CPT | Performed by: NEUROLOGICAL SURGERY

## 2020-10-01 RX ORDER — CEFAZOLIN SODIUM 2 G/50ML
2000 SOLUTION INTRAVENOUS ONCE
Status: CANCELLED | OUTPATIENT
Start: 2020-11-20 | End: 2020-10-01

## 2020-10-01 RX ORDER — CHLORHEXIDINE GLUCONATE 0.12 MG/ML
15 RINSE ORAL ONCE
Status: CANCELLED | OUTPATIENT
Start: 2020-10-01 | End: 2020-10-01

## 2020-10-01 RX ORDER — ASCORBIC ACID 500 MG
500 TABLET ORAL EVERY OTHER DAY
COMMUNITY

## 2020-10-13 ENCOUNTER — CLINICAL SUPPORT (OUTPATIENT)
Dept: INTERNAL MEDICINE CLINIC | Facility: CLINIC | Age: 65
End: 2020-10-13
Payer: MEDICARE

## 2020-10-13 DIAGNOSIS — Z23 ENCOUNTER FOR IMMUNIZATION: Primary | ICD-10-CM

## 2020-10-13 PROCEDURE — G0008 ADMIN INFLUENZA VIRUS VAC: HCPCS

## 2020-10-13 PROCEDURE — 90662 IIV NO PRSV INCREASED AG IM: CPT

## 2020-11-09 ENCOUNTER — DOCUMENTATION (OUTPATIENT)
Dept: NEUROSURGERY | Facility: CLINIC | Age: 65
End: 2020-11-09

## 2020-11-10 ENCOUNTER — LAB (OUTPATIENT)
Dept: LAB | Facility: CLINIC | Age: 65
End: 2020-11-10
Payer: MEDICARE

## 2020-11-10 ENCOUNTER — HOSPITAL ENCOUNTER (OUTPATIENT)
Dept: NON INVASIVE DIAGNOSTICS | Facility: CLINIC | Age: 65
Discharge: HOME | End: 2020-11-10
Payer: MEDICARE

## 2020-11-10 ENCOUNTER — TRANSCRIBE ORDERS (OUTPATIENT)
Dept: LAB | Facility: CLINIC | Age: 65
End: 2020-11-10

## 2020-11-10 DIAGNOSIS — E29.1 HYPOGONADISM MALE: ICD-10-CM

## 2020-11-10 DIAGNOSIS — E29.1 HYPOGONADISM MALE: Primary | ICD-10-CM

## 2020-11-10 DIAGNOSIS — G89.4 CHRONIC PAIN SYNDROME: ICD-10-CM

## 2020-11-10 LAB
ALBUMIN SERPL BCP-MCNC: 3.9 G/DL (ref 3.5–5)
ALP SERPL-CCNC: 65 U/L (ref 46–116)
ALT SERPL W P-5'-P-CCNC: 37 U/L (ref 12–78)
ANION GAP SERPL CALCULATED.3IONS-SCNC: 2 MMOL/L (ref 4–13)
APTT PPP: 28 SECONDS (ref 23–37)
AST SERPL W P-5'-P-CCNC: 17 U/L (ref 5–45)
ATRIAL RATE: 78 BPM
BASOPHILS # BLD AUTO: 0.03 THOUSANDS/ΜL (ref 0–0.1)
BASOPHILS NFR BLD AUTO: 1 % (ref 0–1)
BILIRUB SERPL-MCNC: 0.45 MG/DL (ref 0.2–1)
BILIRUB UR QL STRIP: NEGATIVE
BUN SERPL-MCNC: 14 MG/DL (ref 5–25)
CALCIUM SERPL-MCNC: 9.5 MG/DL (ref 8.3–10.1)
CHLORIDE SERPL-SCNC: 104 MMOL/L (ref 100–108)
CLARITY UR: CLEAR
CO2 SERPL-SCNC: 33 MMOL/L (ref 21–32)
COLOR UR: YELLOW
CREAT SERPL-MCNC: 0.9 MG/DL (ref 0.6–1.3)
EOSINOPHIL # BLD AUTO: 0.07 THOUSAND/ΜL (ref 0–0.61)
EOSINOPHIL NFR BLD AUTO: 2 % (ref 0–6)
ERYTHROCYTE [DISTWIDTH] IN BLOOD BY AUTOMATED COUNT: 12.9 % (ref 11.6–15.1)
EST. AVERAGE GLUCOSE BLD GHB EST-MCNC: 111 MG/DL
GFR SERPL CREATININE-BSD FRML MDRD: 89 ML/MIN/1.73SQ M
GLUCOSE SERPL-MCNC: 133 MG/DL (ref 65–140)
GLUCOSE UR STRIP-MCNC: NEGATIVE MG/DL
HBA1C MFR BLD: 5.5 %
HCT VFR BLD AUTO: 42.8 % (ref 36.5–49.3)
HGB BLD-MCNC: 14.1 G/DL (ref 12–17)
HGB UR QL STRIP.AUTO: NEGATIVE
IMM GRANULOCYTES # BLD AUTO: 0 THOUSAND/UL (ref 0–0.2)
IMM GRANULOCYTES NFR BLD AUTO: 0 % (ref 0–2)
INR PPP: 1.01 (ref 0.84–1.19)
KETONES UR STRIP-MCNC: NEGATIVE MG/DL
LEUKOCYTE ESTERASE UR QL STRIP: NEGATIVE
LYMPHOCYTES # BLD AUTO: 1.06 THOUSANDS/ΜL (ref 0.6–4.47)
LYMPHOCYTES NFR BLD AUTO: 33 % (ref 14–44)
MCH RBC QN AUTO: 29.9 PG (ref 26.8–34.3)
MCHC RBC AUTO-ENTMCNC: 32.9 G/DL (ref 31.4–37.4)
MCV RBC AUTO: 91 FL (ref 82–98)
MONOCYTES # BLD AUTO: 0.36 THOUSAND/ΜL (ref 0.17–1.22)
MONOCYTES NFR BLD AUTO: 11 % (ref 4–12)
NEUTROPHILS # BLD AUTO: 1.65 THOUSANDS/ΜL (ref 1.85–7.62)
NEUTS SEG NFR BLD AUTO: 53 % (ref 43–75)
NITRITE UR QL STRIP: NEGATIVE
NRBC BLD AUTO-RTO: 0 /100 WBCS
P AXIS: 59 DEGREES
PH UR STRIP.AUTO: 7.5 [PH]
PLATELET # BLD AUTO: 178 THOUSANDS/UL (ref 149–390)
PMV BLD AUTO: 10.6 FL (ref 8.9–12.7)
POTASSIUM SERPL-SCNC: 4.4 MMOL/L (ref 3.5–5.3)
PR INTERVAL: 182 MS
PROT SERPL-MCNC: 6.9 G/DL (ref 6.4–8.2)
PROT UR STRIP-MCNC: NEGATIVE MG/DL
PROTHROMBIN TIME: 13.3 SECONDS (ref 11.6–14.5)
QRS AXIS: 54 DEGREES
QRSD INTERVAL: 92 MS
QT INTERVAL: 384 MS
QTC INTERVAL: 437 MS
RBC # BLD AUTO: 4.71 MILLION/UL (ref 3.88–5.62)
SODIUM SERPL-SCNC: 139 MMOL/L (ref 136–145)
SP GR UR STRIP.AUTO: 1.01 (ref 1–1.03)
T WAVE AXIS: 43 DEGREES
UROBILINOGEN UR QL STRIP.AUTO: 0.2 E.U./DL
VENTRICULAR RATE: 78 BPM
WBC # BLD AUTO: 3.17 THOUSAND/UL (ref 4.31–10.16)

## 2020-11-10 PROCEDURE — 93010 ELECTROCARDIOGRAM REPORT: CPT | Performed by: INTERNAL MEDICINE

## 2020-11-10 PROCEDURE — 85730 THROMBOPLASTIN TIME PARTIAL: CPT

## 2020-11-10 PROCEDURE — 84154 ASSAY OF PSA FREE: CPT

## 2020-11-10 PROCEDURE — 85025 COMPLETE CBC W/AUTO DIFF WBC: CPT

## 2020-11-10 PROCEDURE — 81003 URINALYSIS AUTO W/O SCOPE: CPT | Performed by: NEUROLOGICAL SURGERY

## 2020-11-10 PROCEDURE — 84153 ASSAY OF PSA TOTAL: CPT

## 2020-11-10 PROCEDURE — 85610 PROTHROMBIN TIME: CPT

## 2020-11-10 PROCEDURE — 80053 COMPREHEN METABOLIC PANEL: CPT

## 2020-11-10 PROCEDURE — 93005 ELECTROCARDIOGRAM TRACING: CPT | Performed by: NEUROLOGICAL SURGERY

## 2020-11-10 PROCEDURE — 83036 HEMOGLOBIN GLYCOSYLATED A1C: CPT

## 2020-11-10 PROCEDURE — 36415 COLL VENOUS BLD VENIPUNCTURE: CPT

## 2020-11-11 ENCOUNTER — DOCUMENTATION (OUTPATIENT)
Dept: NEUROSURGERY | Facility: CLINIC | Age: 65
End: 2020-11-11

## 2020-11-11 LAB
PSA FREE MFR SERPL: 16.4 %
PSA FREE SERPL-MCNC: 0.46 NG/ML
PSA SERPL-MCNC: 2.8 NG/ML (ref 0–4)

## 2020-11-13 RX ORDER — DIPHENHYDRAMINE HCL 25 MG
25 TABLET ORAL
COMMUNITY

## 2020-11-19 ENCOUNTER — TELEMEDICINE (OUTPATIENT)
Dept: INTERNAL MEDICINE CLINIC | Facility: CLINIC | Age: 65
End: 2020-11-19
Payer: MEDICARE

## 2020-11-19 ENCOUNTER — DOCUMENTATION (OUTPATIENT)
Dept: NEUROSURGERY | Facility: CLINIC | Age: 65
End: 2020-11-19

## 2020-11-19 DIAGNOSIS — Z11.9 ENCOUNTER FOR SCREENING FOR INFECTIOUS AND PARASITIC DISEASES, UNSPECIFIED: Primary | ICD-10-CM

## 2020-11-19 DIAGNOSIS — Z98.890 STATUS POST SURGERY: Primary | ICD-10-CM

## 2020-11-19 PROCEDURE — 99213 OFFICE O/P EST LOW 20 MIN: CPT | Performed by: INTERNAL MEDICINE

## 2020-11-19 RX ORDER — CEPHALEXIN 500 MG/1
500 CAPSULE ORAL EVERY 6 HOURS SCHEDULED
Qty: 12 CAPSULE | Refills: 0 | Status: SHIPPED | OUTPATIENT
Start: 2020-11-19 | End: 2020-11-22

## 2020-11-20 ENCOUNTER — ANESTHESIA (OUTPATIENT)
Dept: PERIOP | Facility: HOSPITAL | Age: 65
End: 2020-11-20
Payer: MEDICARE

## 2020-11-20 ENCOUNTER — ANESTHESIA EVENT (OUTPATIENT)
Dept: PERIOP | Facility: HOSPITAL | Age: 65
End: 2020-11-20
Payer: MEDICARE

## 2020-11-20 ENCOUNTER — HOSPITAL ENCOUNTER (OUTPATIENT)
Facility: HOSPITAL | Age: 65
Setting detail: OUTPATIENT SURGERY
Discharge: HOME | End: 2020-11-20
Attending: NEUROLOGICAL SURGERY | Admitting: NEUROLOGICAL SURGERY
Payer: MEDICARE

## 2020-11-20 VITALS
SYSTOLIC BLOOD PRESSURE: 111 MMHG | TEMPERATURE: 97.8 F | OXYGEN SATURATION: 97 % | DIASTOLIC BLOOD PRESSURE: 74 MMHG | HEART RATE: 82 BPM | RESPIRATION RATE: 18 BRPM

## 2020-11-20 VITALS — HEART RATE: 81 BPM

## 2020-11-20 PROCEDURE — C1772 INFUSION PUMP, PROGRAMMABLE: HCPCS | Performed by: NEUROLOGICAL SURGERY

## 2020-11-20 PROCEDURE — 62362 IMPLANT SPINE INFUSION PUMP: CPT | Performed by: NEUROLOGICAL SURGERY

## 2020-11-20 DEVICE — PUMP 8637-40 SYNCHMED II 40ML EMAN SYMBL
Type: IMPLANTABLE DEVICE | Site: ABDOMEN | Status: FUNCTIONAL
Brand: SYNCHROMED® II

## 2020-11-20 RX ORDER — METOCLOPRAMIDE HYDROCHLORIDE 5 MG/ML
10 INJECTION INTRAMUSCULAR; INTRAVENOUS ONCE AS NEEDED
Status: DISCONTINUED | OUTPATIENT
Start: 2020-11-20 | End: 2020-11-20 | Stop reason: HOSPADM

## 2020-11-20 RX ORDER — SODIUM CHLORIDE, SODIUM LACTATE, POTASSIUM CHLORIDE, CALCIUM CHLORIDE 600; 310; 30; 20 MG/100ML; MG/100ML; MG/100ML; MG/100ML
75 INJECTION, SOLUTION INTRAVENOUS CONTINUOUS
Status: DISCONTINUED | OUTPATIENT
Start: 2020-11-20 | End: 2020-11-20 | Stop reason: HOSPADM

## 2020-11-20 RX ORDER — VANCOMYCIN HYDROCHLORIDE 1 G/20ML
INJECTION, POWDER, LYOPHILIZED, FOR SOLUTION INTRAVENOUS AS NEEDED
Status: DISCONTINUED | OUTPATIENT
Start: 2020-11-20 | End: 2020-11-20 | Stop reason: HOSPADM

## 2020-11-20 RX ORDER — CEFAZOLIN SODIUM 2 G/50ML
2000 SOLUTION INTRAVENOUS ONCE
Status: COMPLETED | OUTPATIENT
Start: 2020-11-20 | End: 2020-11-20

## 2020-11-20 RX ORDER — FENTANYL CITRATE/PF 50 MCG/ML
25 SYRINGE (ML) INJECTION
Status: DISCONTINUED | OUTPATIENT
Start: 2020-11-20 | End: 2020-11-20 | Stop reason: HOSPADM

## 2020-11-20 RX ORDER — MIDAZOLAM HYDROCHLORIDE 2 MG/2ML
INJECTION, SOLUTION INTRAMUSCULAR; INTRAVENOUS AS NEEDED
Status: DISCONTINUED | OUTPATIENT
Start: 2020-11-20 | End: 2020-11-20

## 2020-11-20 RX ORDER — PROPOFOL 10 MG/ML
INJECTION, EMULSION INTRAVENOUS AS NEEDED
Status: DISCONTINUED | OUTPATIENT
Start: 2020-11-20 | End: 2020-11-20

## 2020-11-20 RX ORDER — FENTANYL CITRATE 50 UG/ML
INJECTION, SOLUTION INTRAMUSCULAR; INTRAVENOUS AS NEEDED
Status: DISCONTINUED | OUTPATIENT
Start: 2020-11-20 | End: 2020-11-20

## 2020-11-20 RX ORDER — ONDANSETRON 2 MG/ML
4 INJECTION INTRAMUSCULAR; INTRAVENOUS ONCE AS NEEDED
Status: DISCONTINUED | OUTPATIENT
Start: 2020-11-20 | End: 2020-11-20 | Stop reason: HOSPADM

## 2020-11-20 RX ORDER — ONDANSETRON 2 MG/ML
INJECTION INTRAMUSCULAR; INTRAVENOUS AS NEEDED
Status: DISCONTINUED | OUTPATIENT
Start: 2020-11-20 | End: 2020-11-20

## 2020-11-20 RX ORDER — LIDOCAINE HYDROCHLORIDE 10 MG/ML
INJECTION, SOLUTION EPIDURAL; INFILTRATION; INTRACAUDAL; PERINEURAL AS NEEDED
Status: DISCONTINUED | OUTPATIENT
Start: 2020-11-20 | End: 2020-11-20

## 2020-11-20 RX ORDER — HYDROMORPHONE HCL/PF 1 MG/ML
0.5 SYRINGE (ML) INJECTION
Status: DISCONTINUED | OUTPATIENT
Start: 2020-11-20 | End: 2020-11-20 | Stop reason: HOSPADM

## 2020-11-20 RX ORDER — MEPERIDINE HYDROCHLORIDE 25 MG/ML
12.5 INJECTION INTRAMUSCULAR; INTRAVENOUS; SUBCUTANEOUS
Status: DISCONTINUED | OUTPATIENT
Start: 2020-11-20 | End: 2020-11-20 | Stop reason: HOSPADM

## 2020-11-20 RX ORDER — OXYCODONE HYDROCHLORIDE AND ACETAMINOPHEN 5; 325 MG/1; MG/1
1 TABLET ORAL EVERY 4 HOURS PRN
Status: DISCONTINUED | OUTPATIENT
Start: 2020-11-20 | End: 2020-11-20 | Stop reason: HOSPADM

## 2020-11-20 RX ORDER — CHLORHEXIDINE GLUCONATE 0.12 MG/ML
15 RINSE ORAL ONCE
Status: COMPLETED | OUTPATIENT
Start: 2020-11-20 | End: 2020-11-20

## 2020-11-20 RX ADMIN — ONDANSETRON 4 MG: 2 INJECTION INTRAMUSCULAR; INTRAVENOUS at 10:34

## 2020-11-20 RX ADMIN — CEFAZOLIN SODIUM 2000 MG: 2 SOLUTION INTRAVENOUS at 10:00

## 2020-11-20 RX ADMIN — PROPOFOL 200 MG: 10 INJECTION, EMULSION INTRAVENOUS at 10:07

## 2020-11-20 RX ADMIN — SODIUM CHLORIDE, SODIUM LACTATE, POTASSIUM CHLORIDE, AND CALCIUM CHLORIDE 75 ML/HR: .6; .31; .03; .02 INJECTION, SOLUTION INTRAVENOUS at 08:36

## 2020-11-20 RX ADMIN — FENTANYL CITRATE 25 MCG: 50 INJECTION, SOLUTION INTRAMUSCULAR; INTRAVENOUS at 10:38

## 2020-11-20 RX ADMIN — FENTANYL CITRATE 25 MCG: 50 INJECTION, SOLUTION INTRAMUSCULAR; INTRAVENOUS at 10:27

## 2020-11-20 RX ADMIN — CHLORHEXIDINE GLUCONATE 0.12% ORAL RINSE 15 ML: 1.2 LIQUID ORAL at 08:36

## 2020-11-20 RX ADMIN — MIDAZOLAM 2 MG: 1 INJECTION INTRAMUSCULAR; INTRAVENOUS at 09:58

## 2020-11-20 RX ADMIN — LIDOCAINE HYDROCHLORIDE 50 MG: 10 INJECTION, SOLUTION EPIDURAL; INFILTRATION; INTRACAUDAL; PERINEURAL at 10:07

## 2020-11-20 RX ADMIN — FENTANYL CITRATE 50 MCG: 50 INJECTION, SOLUTION INTRAMUSCULAR; INTRAVENOUS at 10:03

## 2020-11-23 ENCOUNTER — TELEPHONE (OUTPATIENT)
Dept: NEUROSURGERY | Facility: CLINIC | Age: 65
End: 2020-11-23

## 2020-11-24 ENCOUNTER — OFFICE VISIT (OUTPATIENT)
Dept: NEUROSURGERY | Facility: CLINIC | Age: 65
End: 2020-11-24

## 2020-11-24 VITALS
TEMPERATURE: 98.6 F | SYSTOLIC BLOOD PRESSURE: 108 MMHG | DIASTOLIC BLOOD PRESSURE: 68 MMHG | HEART RATE: 96 BPM | RESPIRATION RATE: 16 BRPM | WEIGHT: 190 LBS | BODY MASS INDEX: 26.6 KG/M2 | HEIGHT: 71 IN

## 2020-11-24 DIAGNOSIS — G89.4 CHRONIC PAIN SYNDROME: Primary | ICD-10-CM

## 2020-11-24 DIAGNOSIS — Z46.2 END OF BATTERY LIFE OF INTRATHECAL INFUSION PUMP: ICD-10-CM

## 2020-11-24 DIAGNOSIS — M54.12 RADICULOPATHY, CERVICAL: ICD-10-CM

## 2020-11-24 PROCEDURE — 99024 POSTOP FOLLOW-UP VISIT: CPT | Performed by: NURSE PRACTITIONER

## 2020-11-24 RX ORDER — TIZANIDINE 4 MG/1
12 TABLET ORAL EVERY 4 HOURS PRN
COMMUNITY

## 2020-12-02 ENCOUNTER — HOSPITAL ENCOUNTER (OUTPATIENT)
Dept: RADIOLOGY | Facility: HOSPITAL | Age: 65
Discharge: HOME | End: 2020-12-02

## 2020-12-04 ENCOUNTER — OFFICE VISIT (OUTPATIENT)
Dept: NEUROSURGERY | Facility: CLINIC | Age: 65
End: 2020-12-04

## 2020-12-04 VITALS
WEIGHT: 196 LBS | SYSTOLIC BLOOD PRESSURE: 110 MMHG | BODY MASS INDEX: 27.44 KG/M2 | HEART RATE: 80 BPM | HEIGHT: 71 IN | TEMPERATURE: 97.5 F | RESPIRATION RATE: 16 BRPM | DIASTOLIC BLOOD PRESSURE: 70 MMHG

## 2020-12-04 DIAGNOSIS — G89.4 CHRONIC PAIN SYNDROME: Primary | ICD-10-CM

## 2020-12-04 DIAGNOSIS — Z46.2 END OF BATTERY LIFE OF INTRATHECAL INFUSION PUMP: ICD-10-CM

## 2020-12-04 DIAGNOSIS — Z98.890 S/P INSERTION OF INTRATHECAL PUMP: ICD-10-CM

## 2020-12-04 PROCEDURE — 99024 POSTOP FOLLOW-UP VISIT: CPT | Performed by: NURSE PRACTITIONER

## 2020-12-14 ENCOUNTER — TELEPHONE (OUTPATIENT)
Dept: NEUROSURGERY | Facility: CLINIC | Age: 65
End: 2020-12-14

## 2020-12-18 DIAGNOSIS — Z11.9 ENCOUNTER FOR SCREENING FOR INFECTIOUS AND PARASITIC DISEASES, UNSPECIFIED: ICD-10-CM

## 2020-12-18 PROCEDURE — U0003 INFECTIOUS AGENT DETECTION BY NUCLEIC ACID (DNA OR RNA); SEVERE ACUTE RESPIRATORY SYNDROME CORONAVIRUS 2 (SARS-COV-2) (CORONAVIRUS DISEASE [COVID-19]), AMPLIFIED PROBE TECHNIQUE, MAKING USE OF HIGH THROUGHPUT TECHNOLOGIES AS DESCRIBED BY CMS-2020-01-R: HCPCS | Performed by: INTERNAL MEDICINE

## 2020-12-19 ENCOUNTER — TELEPHONE (OUTPATIENT)
Dept: INTERNAL MEDICINE CLINIC | Facility: CLINIC | Age: 65
End: 2020-12-19

## 2020-12-19 LAB — SARS-COV-2 RNA SPEC QL NAA+PROBE: NOT DETECTED

## 2021-03-09 ENCOUNTER — TELEPHONE (OUTPATIENT)
Dept: INTERNAL MEDICINE CLINIC | Facility: CLINIC | Age: 66
End: 2021-03-09

## 2021-03-09 NOTE — TELEPHONE ENCOUNTER
Patient is requesting referral for a physical therapy for his right shoulder     To doctor  Diana Da Silva

## 2021-03-10 DIAGNOSIS — M25.511 RIGHT SHOULDER PAIN, UNSPECIFIED CHRONICITY: Primary | ICD-10-CM

## 2021-03-10 DIAGNOSIS — Z23 ENCOUNTER FOR IMMUNIZATION: ICD-10-CM

## 2021-05-20 ENCOUNTER — TELEPHONE (OUTPATIENT)
Dept: INTERNAL MEDICINE CLINIC | Facility: CLINIC | Age: 66
End: 2021-05-20

## 2021-05-20 DIAGNOSIS — Z00.00 WELL ADULT EXAM: Primary | ICD-10-CM

## 2021-05-20 DIAGNOSIS — Z11.59 NEED FOR HEPATITIS C SCREENING TEST: ICD-10-CM

## 2021-05-20 DIAGNOSIS — Z11.4 SCREENING FOR HIV (HUMAN IMMUNODEFICIENCY VIRUS): ICD-10-CM

## 2021-05-20 NOTE — TELEPHONE ENCOUNTER
SEEING  PAULINO    FOR  MEDICARE  WELLNESS PHY   WANTS  TO KNOW  IF  HE  CAN  HAVE  LAB  SLIP  TO  HAVE  SOME  LABS  DONE

## 2021-06-03 ENCOUNTER — OFFICE VISIT (OUTPATIENT)
Dept: DERMATOLOGY | Facility: CLINIC | Age: 66
End: 2021-06-03
Payer: MEDICARE

## 2021-06-03 DIAGNOSIS — Z13.89 SCREENING FOR SKIN CONDITION: ICD-10-CM

## 2021-06-03 DIAGNOSIS — L57.0 ACTINIC KERATOSIS: Primary | ICD-10-CM

## 2021-06-03 DIAGNOSIS — L82.1 SEBORRHEIC KERATOSIS: ICD-10-CM

## 2021-06-03 PROCEDURE — 99213 OFFICE O/P EST LOW 20 MIN: CPT | Performed by: DERMATOLOGY

## 2021-06-03 PROCEDURE — 17000 DESTRUCT PREMALG LESION: CPT | Performed by: DERMATOLOGY

## 2021-06-03 NOTE — PATIENT INSTRUCTIONS
Actinic Keratosis:  Patient advised lesions are precancers  These should resolve with cryosurgery if not to let us know sun block recommended  Seborrheic Keratosis  Patient reasurred these are normal growths we acquire with age no treatment needed  Screening for Dermatologic Disorders: Nothing else of concern noted on complete exam follow up in 1 year  Treatment with Cryotherapy    The doctor has treated your skin with nitrogen, which is 320 degrees Fahrenheit below zero  He has given the treated area "frostbite "    Stinging should subside within a few hours  You can take Tylenol for pain, if needed  Over the next few days, it is normal if the area becomes reddened, a blood blister, or swollen with fluid  If the lesion treated was near the eye - you could get a swollen eye over the next few days  Do not panic! This is all temporary, and will resolve with time  There is no special treatment - just keep the area clean  Makeup and BandAids can be used, if preferred  When the area starts to dry up and peel off, using Vaseline can help healing  It usually takes up to a month for it to heal   Some lesions are recurrent and may require repeat treatments  If a lesion has not healed in one month, please don't hesitate to contact us  If you have any further questions that are not answered here, please call us  [phone]    Thank you for allowing us to care for you

## 2021-06-08 ENCOUNTER — TELEPHONE (OUTPATIENT)
Dept: INTERNAL MEDICINE CLINIC | Facility: CLINIC | Age: 66
End: 2021-06-08

## 2021-06-08 ENCOUNTER — OFFICE VISIT (OUTPATIENT)
Dept: INTERNAL MEDICINE CLINIC | Facility: CLINIC | Age: 66
End: 2021-06-08
Payer: MEDICARE

## 2021-06-08 ENCOUNTER — APPOINTMENT (OUTPATIENT)
Dept: LAB | Facility: HOSPITAL | Age: 66
End: 2021-06-08
Attending: INTERNAL MEDICINE
Payer: MEDICARE

## 2021-06-08 VITALS
SYSTOLIC BLOOD PRESSURE: 120 MMHG | WEIGHT: 199.4 LBS | HEART RATE: 68 BPM | TEMPERATURE: 98.5 F | DIASTOLIC BLOOD PRESSURE: 72 MMHG | OXYGEN SATURATION: 99 % | HEIGHT: 71 IN | BODY MASS INDEX: 27.92 KG/M2

## 2021-06-08 DIAGNOSIS — K21.9 GASTROESOPHAGEAL REFLUX DISEASE WITHOUT ESOPHAGITIS: ICD-10-CM

## 2021-06-08 DIAGNOSIS — Z12.5 SCREENING FOR PROSTATE CANCER: ICD-10-CM

## 2021-06-08 DIAGNOSIS — Z00.00 WELL ADULT EXAM: ICD-10-CM

## 2021-06-08 DIAGNOSIS — R73.01 IMPAIRED FASTING GLUCOSE: Primary | ICD-10-CM

## 2021-06-08 DIAGNOSIS — F41.9 ANXIETY: ICD-10-CM

## 2021-06-08 DIAGNOSIS — M54.12 RADICULOPATHY, CERVICAL: ICD-10-CM

## 2021-06-08 DIAGNOSIS — M96.1 FAILED BACK SYNDROME: ICD-10-CM

## 2021-06-08 DIAGNOSIS — Z11.4 SCREENING FOR HIV (HUMAN IMMUNODEFICIENCY VIRUS): ICD-10-CM

## 2021-06-08 DIAGNOSIS — Z11.59 NEED FOR HEPATITIS C SCREENING TEST: ICD-10-CM

## 2021-06-08 DIAGNOSIS — M54.16 LUMBAR RADICULOPATHY: ICD-10-CM

## 2021-06-08 DIAGNOSIS — E29.1 TESTICULAR HYPOGONADISM: ICD-10-CM

## 2021-06-08 LAB
ALBUMIN SERPL BCP-MCNC: 3.6 G/DL (ref 3.5–5)
ALP SERPL-CCNC: 66 U/L (ref 46–116)
ALT SERPL W P-5'-P-CCNC: 30 U/L (ref 12–78)
ANION GAP SERPL CALCULATED.3IONS-SCNC: 3 MMOL/L (ref 4–13)
AST SERPL W P-5'-P-CCNC: 22 U/L (ref 5–45)
BASOPHILS # BLD AUTO: 0.02 THOUSANDS/ΜL (ref 0–0.1)
BASOPHILS NFR BLD AUTO: 1 % (ref 0–1)
BILIRUB SERPL-MCNC: 0.67 MG/DL (ref 0.2–1)
BUN SERPL-MCNC: 16 MG/DL (ref 5–25)
CALCIUM SERPL-MCNC: 8.7 MG/DL (ref 8.3–10.1)
CHLORIDE SERPL-SCNC: 105 MMOL/L (ref 100–108)
CHOLEST SERPL-MCNC: 152 MG/DL (ref 50–200)
CO2 SERPL-SCNC: 34 MMOL/L (ref 21–32)
CREAT SERPL-MCNC: 0.86 MG/DL (ref 0.6–1.3)
EOSINOPHIL # BLD AUTO: 0.12 THOUSAND/ΜL (ref 0–0.61)
EOSINOPHIL NFR BLD AUTO: 3 % (ref 0–6)
ERYTHROCYTE [DISTWIDTH] IN BLOOD BY AUTOMATED COUNT: 13.2 % (ref 11.6–15.1)
EST. AVERAGE GLUCOSE BLD GHB EST-MCNC: 117 MG/DL
GFR SERPL CREATININE-BSD FRML MDRD: 90 ML/MIN/1.73SQ M
GLUCOSE P FAST SERPL-MCNC: 108 MG/DL (ref 65–99)
HBA1C MFR BLD: 5.7 %
HCT VFR BLD AUTO: 45 % (ref 36.5–49.3)
HDLC SERPL-MCNC: 42 MG/DL
HGB BLD-MCNC: 14.5 G/DL (ref 12–17)
IMM GRANULOCYTES # BLD AUTO: 0.01 THOUSAND/UL (ref 0–0.2)
IMM GRANULOCYTES NFR BLD AUTO: 0 % (ref 0–2)
LDLC SERPL CALC-MCNC: 98 MG/DL (ref 0–100)
LYMPHOCYTES # BLD AUTO: 1.29 THOUSANDS/ΜL (ref 0.6–4.47)
LYMPHOCYTES NFR BLD AUTO: 35 % (ref 14–44)
MCH RBC QN AUTO: 28.5 PG (ref 26.8–34.3)
MCHC RBC AUTO-ENTMCNC: 32.2 G/DL (ref 31.4–37.4)
MCV RBC AUTO: 88 FL (ref 82–98)
MONOCYTES # BLD AUTO: 0.36 THOUSAND/ΜL (ref 0.17–1.22)
MONOCYTES NFR BLD AUTO: 10 % (ref 4–12)
NEUTROPHILS # BLD AUTO: 1.93 THOUSANDS/ΜL (ref 1.85–7.62)
NEUTS SEG NFR BLD AUTO: 51 % (ref 43–75)
NONHDLC SERPL-MCNC: 110 MG/DL
NRBC BLD AUTO-RTO: 0 /100 WBCS
PLATELET # BLD AUTO: 182 THOUSANDS/UL (ref 149–390)
PMV BLD AUTO: 10.7 FL (ref 8.9–12.7)
POTASSIUM SERPL-SCNC: 4.3 MMOL/L (ref 3.5–5.3)
PROT SERPL-MCNC: 6.7 G/DL (ref 6.4–8.2)
RBC # BLD AUTO: 5.09 MILLION/UL (ref 3.88–5.62)
SODIUM SERPL-SCNC: 142 MMOL/L (ref 136–145)
TRIGL SERPL-MCNC: 59 MG/DL
TSH SERPL DL<=0.05 MIU/L-ACNC: 0.9 UIU/ML (ref 0.36–3.74)
URATE SERPL-MCNC: 4.8 MG/DL (ref 4.2–8)
WBC # BLD AUTO: 3.73 THOUSAND/UL (ref 4.31–10.16)

## 2021-06-08 PROCEDURE — 84443 ASSAY THYROID STIM HORMONE: CPT

## 2021-06-08 PROCEDURE — 80053 COMPREHEN METABOLIC PANEL: CPT

## 2021-06-08 PROCEDURE — 84550 ASSAY OF BLOOD/URIC ACID: CPT

## 2021-06-08 PROCEDURE — 99214 OFFICE O/P EST MOD 30 MIN: CPT | Performed by: INTERNAL MEDICINE

## 2021-06-08 PROCEDURE — 87389 HIV-1 AG W/HIV-1&-2 AB AG IA: CPT

## 2021-06-08 PROCEDURE — 85025 COMPLETE CBC W/AUTO DIFF WBC: CPT

## 2021-06-08 PROCEDURE — G0439 PPPS, SUBSEQ VISIT: HCPCS | Performed by: INTERNAL MEDICINE

## 2021-06-08 PROCEDURE — 83036 HEMOGLOBIN GLYCOSYLATED A1C: CPT

## 2021-06-08 PROCEDURE — 86803 HEPATITIS C AB TEST: CPT

## 2021-06-08 PROCEDURE — 1123F ACP DISCUSS/DSCN MKR DOCD: CPT | Performed by: INTERNAL MEDICINE

## 2021-06-08 PROCEDURE — 36415 COLL VENOUS BLD VENIPUNCTURE: CPT

## 2021-06-08 PROCEDURE — 80061 LIPID PANEL: CPT

## 2021-06-08 NOTE — PROGRESS NOTES
Assessment and Plan:     Problem List Items Addressed This Visit     None        BMI Counseling: Body mass index is 27 81 kg/m²  The BMI is above normal  Nutrition recommendations include decreasing portion sizes, decreasing fast food intake, consuming healthier snacks, limiting drinks that contain sugar, moderation in carbohydrate intake and reducing intake of saturated and trans fat  Exercise recommendations include moderate physical activity 150 minutes/week  No pharmacotherapy was ordered  Preventive health issues were discussed with patient, and age appropriate screening tests were ordered as noted in patient's After Visit Summary  Personalized health advice and appropriate referrals for health education or preventive services given if needed, as noted in patient's After Visit Summary       History of Present Illness:     Patient presents for Medicare Annual Wellness visit    Patient Care Team:  Alonzo Burt MD as PCP - General  MD Alonzo Gallo Ace, MD Benton Rho, MD Wally Spears, MD     Problem List:     Patient Active Problem List   Diagnosis    Primary osteoarthritis of left knee    Lumbar facet arthropathy    Anxiety    Chronic bilateral thoracic back pain    Fatigue    Secondary polycythemia    Chronic pain disorder    Failed back syndrome    Testicular hypogonadism    Impaired fasting glucose    Screening for skin condition    Abscess of skin of abdomen    Chronic pain syndrome    Lumbar radiculopathy    S/P insertion of intrathecal pump    Status post insertion of spinal cord stimulator    Spinal cord stimulator dysfunction ([facility] [address] )    Radiculopathy, cervical    End of battery life of intrathecal infusion pump      Past Medical and Surgical History:     Past Medical History:   Diagnosis Date    Acute delirium     history of post op delirium    Anxiety with Persistent Worry about Recurrent Panic Attacks     Depression     GERD (gastroesophageal reflux disease)     controlled with medications    History of Cindy-Barr virus infection     Osteoarthritis     Spinal cord stimulator dysfunction (HCC)      Past Surgical History:   Procedure Laterality Date    IMPLANTATION / PLACEMENT PERMANENT EPIDURAL CATHETER  2007    Intrathecal  Dr Gerard Candelario   [facility]    JOINT REPLACEMENT Bilateral     OTHER SURGICAL HISTORY  04/11/2014    SF: Replacement of intrathecal pain pump reservoir w/programming    WA INSERT SPINE INFUSN DEVICE,SUBCUT Right 11/20/2020    Procedure: REPLACEMENT INTRATHECAL PAIN PUMP, RIGHT;  Surgeon: Shyann Locke MD;  Location:  MAIN OR;  Service: Neurosurgery    REPLACEMENT TOTAL KNEE Right 2009    [facility]/Dr Jae Hodge REPLACEMENT TOTAL KNEE Left 06/22/2018    TONSILLECTOMY        Family History:     Family History   Problem Relation Age of Onset    Lupus Mother     Heart failure Mother     Heart failure Father     Kidney failure Father         Acute    Heart disease Family     Neuropathy Family         Peripheral      Social History:     E-Cigarette/Vaping    E-Cigarette Use Never User      E-Cigarette/Vaping Substances    Nicotine No     THC No     CBD No     Flavoring No     Other No     Unknown No      Social History     Socioeconomic History    Marital status: /Civil Union     Spouse name: None    Number of children: None    Years of education: None    Highest education level: None   Occupational History    None   Social Needs    Financial resource strain: None    Food insecurity     Worry: None     Inability: None    Transportation needs     Medical: None     Non-medical: None   Tobacco Use    Smoking status: Never Smoker    Smokeless tobacco: Never Used   Substance and Sexual Activity    Alcohol use: No    Drug use: No    Sexual activity: Yes     Partners: Female   Lifestyle    Physical activity     Days per week: 7 days     Minutes per session: 60 min    Stress: Not at all Relationships    Social connections     Talks on phone: None     Gets together: None     Attends Sikhism service: None     Active member of club or organization: None     Attends meetings of clubs or organizations: None     Relationship status: None    Intimate partner violence     Fear of current or ex partner: None     Emotionally abused: None     Physically abused: None     Forced sexual activity: None   Other Topics Concern    None   Social History Narrative    None      Medications and Allergies:     Current Outpatient Medications   Medication Sig Dispense Refill    anastrozole (ARIMIDEX) 1 mg tablet Take 1 mg by mouth 3 (three) times a week   0    ascorbic acid (VITAMIN C) 500 mg tablet Take 500 mg by mouth every other day       aspirin (ASPIR-LOW) 81 mg EC tablet Take 1 tablet by mouth daily      BD ECLIPSE SYRINGE 23G X 1" 3 ML MISC As directed  12    buPROPion (WELLBUTRIN SR) 150 mg 12 hr tablet Take 450 mg by mouth daily       cholecalciferol (VITAMIN D3) 1,000 units tablet Take 1,000 Units by mouth as needed       diphenhydrAMINE (BENADRYL) 25 mg tablet Take 25 mg by mouth daily at bedtime as needed for sleep      famotidine (PEPCID) 40 MG tablet 40 mg as needed       gabapentin (NEURONTIN) 300 mg capsule daily at bedtime as needed   0    ibuprofen (MOTRIN) 200 mg tablet 600-800 mg  TID PRN      Omega-3 Fatty Acids (FISH OIL) 1,000 mg Take 1 capsule by mouth daily        oxyCODONE (OxyCONTIN) 80 mg 12 hr tablet Take 40 mg by mouth every 12 (twelve) hours       oxyCODONE (ROXICODONE) 30 MG immediate release tablet Take 30 mg by mouth 2 (two) times a day       sildenafil (VIAGRA) 100 mg tablet Take 100 mg by mouth as needed        testosterone cypionate (DEPO-TESTOSTERONE) 200 mg/mL SOLN Inject into a muscle Takes every 4 days      tiZANidine (ZANAFLEX) 4 mg tablet Take 12 mg by mouth every 4 (four) hours as needed for muscle spasms      ALPRAZolam (XANAX) 1 mg tablet Take 1 tablet by mouth daily at bedtime as needed 1 to 2 tablets a day      b complex vitamins tablet Take 1 tablet by mouth as needed       methocarbamol (ROBAXIN) 750 mg tablet Take 500 mg by mouth 2 (two) times a day   5    NON FORMULARY Compound pain cream       No current facility-administered medications for this visit  Allergies   Allergen Reactions    Molds & Smuts Swelling and Other (See Comments)     Other reaction(s): watery, itchy eyes    Pregabalin Edema     lyrica     Cat Hair Extract Itching and Other (See Comments)     Other reaction(s): watery, itchy eyes  Dog hair, dog dander    Dust Mite Extract Other (See Comments)     Other reaction(s): watery, itchy eyes    Other      Pine nuts        Immunizations:     Immunization History   Administered Date(s) Administered    INFLUENZA 12/19/2014    Influenza Quadrivalent, 6-35 Months IM 12/04/2015, 10/20/2017    Influenza, high dose seasonal 0 7 mL 10/13/2020    Influenza, recombinant, quadrivalent,injectable, preservative free 11/16/2018    SARS-CoV-2 / COVID-19 mRNA IM (Pfizer-BioNTech) 03/05/2021, 03/26/2021    Tdap 1955      Health Maintenance:         Topic Date Due    Hepatitis C Screening  Never done    Colorectal Cancer Screening  12/13/2021         Topic Date Due    DTaP,Tdap,and Td Vaccines (1 - Tdap) 02/28/1976    Pneumococcal Vaccine: 65+ Years (1 of 1 - PPSV23) Never done      Medicare Health Risk Assessment:     /72 (BP Location: Left arm, Patient Position: Sitting, Cuff Size: Standard) Comment: bp  Pulse 68   Temp 98 5 °F (36 9 °C) (Temporal) Comment: 600MG OF MOTRIN  Ht 5' 11" (1 803 m)   Wt 90 4 kg (199 lb 6 4 oz)   SpO2 99%   BMI 27 81 kg/m²      Karyn Alberts is here for his Initial Wellness visit  Health Risk Assessment:   Patient rates overall health as excellent  Patient feels that their physical health rating is same  Patient is very satisfied with their life  Eyesight was rated as slightly worse   Hearing was rated as same  Patient feels that their emotional and mental health rating is slightly better  Patients states they are sometimes angry  Patient states they are never, rarely unusually tired/fatigued  Pain experienced in the last 7 days has been some  Patient's pain rating has been 4/10  Patient states that he has experienced weight loss or gain in last 6 months  Depression Screening:   PHQ-2 Score: 0      Fall Risk Screening: In the past year, patient has experienced: no history of falling in past year      Home Safety:  Patient does not have trouble with stairs inside or outside of their home  Patient has working smoke alarms and has no working carbon monoxide detector  Home safety hazards include: none  Nutrition:   Current diet is No Added Salt, Regular, Low Carb and Low Saturated Fat  Medications:   Patient is currently taking over-the-counter supplements  OTC medications include: see medication list  Patient is able to manage medications  Activities of Daily Living (ADLs)/Instrumental Activities of Daily Living (IADLs):   Walk and transfer into and out of bed and chair?: Yes  Dress and groom yourself?: Yes    Bathe or shower yourself?: Yes    Feed yourself?  Yes  Do your laundry/housekeeping?: Yes  Manage your money, pay your bills and track your expenses?: Yes  Make your own meals?: Yes    Do your own shopping?: Yes    Previous Hospitalizations:   Any hospitalizations or ED visits within the last 12 months?: No      Advance Care Planning:   Living will: No    Durable POA for healthcare: No    Advanced directive: No      Cognitive Screening:   Provider or family/friend/caregiver concerned regarding cognition?: No    PREVENTIVE SCREENINGS      Cardiovascular Screening:    General: Screening Current      Diabetes Screening:     General: Screening Current      Colorectal Cancer Screening:     General: Screening Current      Prostate Cancer Screening:    General: Screening Current Osteoporosis Screening:    General: Screening Not Indicated      Abdominal Aortic Aneurysm (AAA) Screening:    Risk factors include: age between 73-67 yo        Lung Cancer Screening:     General: Screening Not Indicated      Hepatitis C Screening:    General: Screening Current    Screening, Brief Intervention, and Referral to Treatment (SBIRT)    Screening  Typical number of drinks in a day: 0  Typical number of drinks in a week: 0  Interpretation: Low risk drinking behavior      Review of Current Opioid Use    Opioid Risk Tool (ORT) Interpretation: Complete Opioid Risk Tool (ORT)      Kaela Healy MD

## 2021-06-08 NOTE — PROGRESS NOTES
Assessment/Plan:    Diagnoses and all orders for this visit:    Impaired fasting glucose  -     CBC and differential; Future  -     Comprehensive metabolic panel; Future  -     Hemoglobin A1C; Future    Testicular hypogonadism  -     PSA, Total Screen; Future    Lumbar radiculopathy    Radiculopathy, cervical    Anxiety  -     TSH, 3rd generation with Free T4 reflex; Future    Failed back syndrome    Gastroesophageal reflux disease without esophagitis    Screening for prostate cancer  -     PSA, Total Screen; Future              Patient Instructions   Lab data reviewed in detail and compared prior    Impaired fasting glucose-A1c pending will follow accordingly    Depression and anxiety stable on Wellbutrin and alprazolam, following with Psychiatry who will be retiring  I can  these prescriptions as long as disease process remained stable  Failed back with lumbar and cervical radiculopathy under care of pain management    GERD stable with famotidine    Hypogonadism following with endocrinology on testosterone IM and anastrozole    Routine follow-up after labs in 6 months, sooner as needed  Health maintenance-PSA prior to follow-up, [place] due in December, will order at follow-up  Subjective:      Patient ID: Matt Newell is a 77 y o  male    F/U MMP, review labs and awv  Feeling generally well  Chronic Pain-Working w/ pain mngt continues to wean narcotics, now down to 40 mg bid oxycontin + prns  Depression/anxiety stable on Wellbutrin and Xanax, Dr Carolynn Dakins retiring, so I will need to prescribe  Hypogonadism- taking IM testosterone + anastrozole to counteract se of testosterone under c/o endo, Dr Stephanie De La Torre  Knee better s/p tka  Walking 5-7d per week as well as PT 2-3x per week  Plans to get back to [facility] soon  No cp/sob, dizzy or lightheaded            Current Outpatient Medications:     anastrozole (ARIMIDEX) 1 mg tablet, Take 1 mg by mouth 3 (three) times a week , Disp: , Rfl: 0    ascorbic acid (VITAMIN C) 500 mg tablet, Take 500 mg by mouth every other day , Disp: , Rfl:     aspirin (ASPIR-LOW) 81 mg EC tablet, Take 1 tablet by mouth daily, Disp: , Rfl:     BD ECLIPSE SYRINGE 23G X 1" 3 ML MISC, As directed, Disp: , Rfl: 12    buPROPion (WELLBUTRIN SR) 150 mg 12 hr tablet, Take 450 mg by mouth daily , Disp: , Rfl:     cholecalciferol (VITAMIN D3) 1,000 units tablet, Take 1,000 Units by mouth as needed , Disp: , Rfl:     diphenhydrAMINE (BENADRYL) 25 mg tablet, Take 25 mg by mouth daily at bedtime as needed for sleep, Disp: , Rfl:     famotidine (PEPCID) 40 MG tablet, 40 mg as needed , Disp: , Rfl:     gabapentin (NEURONTIN) 300 mg capsule, daily at bedtime as needed , Disp: , Rfl: 0    ibuprofen (MOTRIN) 200 mg tablet, 600-800 mg  TID PRN, Disp: , Rfl:     NON FORMULARY, Compound pain cream, Disp: , Rfl:     Omega-3 Fatty Acids (FISH OIL) 1,000 mg, Take 1 capsule by mouth daily  , Disp: , Rfl:     oxyCODONE (OxyCONTIN) 80 mg 12 hr tablet, Take 40 mg by mouth every 12 (twelve) hours , Disp: , Rfl:     oxyCODONE (ROXICODONE) 30 MG immediate release tablet, Take 30 mg by mouth 2 (two) times a day , Disp: , Rfl:     sildenafil (VIAGRA) 100 mg tablet, Take 100 mg by mouth as needed  , Disp: , Rfl:     testosterone cypionate (DEPO-TESTOSTERONE) 200 mg/mL SOLN, Inject into a muscle Takes every 4 days, Disp: , Rfl:     tiZANidine (ZANAFLEX) 4 mg tablet, Take 12 mg by mouth every 4 (four) hours as needed for muscle spasms, Disp: , Rfl:     ALPRAZolam (XANAX) 1 mg tablet, Take 1 tablet by mouth daily at bedtime as needed 1 to 2 tablets a day, Disp: , Rfl:     b complex vitamins tablet, Take 1 tablet by mouth as needed , Disp: , Rfl:     methocarbamol (ROBAXIN) 750 mg tablet, Take 500 mg by mouth 2 (two) times a day , Disp: , Rfl: 5    Recent Results (from the past 1008 hour(s))   CBC and differential    Collection Time: 06/08/21 12:33 PM   Result Value Ref Range    WBC 3 73 (L) 4 31 - 10 16 Thousand/uL    RBC 5 09 3 88 - 5 62 Million/uL    Hemoglobin 14 5 12 0 - 17 0 g/dL    Hematocrit 45 0 36 5 - 49 3 %    MCV 88 82 - 98 fL    MCH 28 5 26 8 - 34 3 pg    MCHC 32 2 31 4 - 37 4 g/dL    RDW 13 2 11 6 - 15 1 %    MPV 10 7 8 9 - 12 7 fL    Platelets 878 849 - 043 Thousands/uL    nRBC 0 /100 WBCs    Neutrophils Relative 51 43 - 75 %    Immat GRANS % 0 0 - 2 %    Lymphocytes Relative 35 14 - 44 %    Monocytes Relative 10 4 - 12 %    Eosinophils Relative 3 0 - 6 %    Basophils Relative 1 0 - 1 %    Neutrophils Absolute 1 93 1 85 - 7 62 Thousands/µL    Immature Grans Absolute 0 01 0 00 - 0 20 Thousand/uL    Lymphocytes Absolute 1 29 0 60 - 4 47 Thousands/µL    Monocytes Absolute 0 36 0 17 - 1 22 Thousand/µL    Eosinophils Absolute 0 12 0 00 - 0 61 Thousand/µL    Basophils Absolute 0 02 0 00 - 0 10 Thousands/µL   Comprehensive metabolic panel    Collection Time: 06/08/21 12:33 PM   Result Value Ref Range    Sodium 142 136 - 145 mmol/L    Potassium 4 3 3 5 - 5 3 mmol/L    Chloride 105 100 - 108 mmol/L    CO2 34 (H) 21 - 32 mmol/L    ANION GAP 3 (L) 4 - 13 mmol/L    BUN 16 5 - 25 mg/dL    Creatinine 0 86 0 60 - 1 30 mg/dL    Glucose, Fasting 108 (H) 65 - 99 mg/dL    Calcium 8 7 8 3 - 10 1 mg/dL    AST 22 5 - 45 U/L    ALT 30 12 - 78 U/L    Alkaline Phosphatase 66 46 - 116 U/L    Total Protein 6 7 6 4 - 8 2 g/dL    Albumin 3 6 3 5 - 5 0 g/dL    Total Bilirubin 0 67 0 20 - 1 00 mg/dL    eGFR 90 ml/min/1 73sq m   Lipid panel    Collection Time: 06/08/21 12:33 PM   Result Value Ref Range    Cholesterol 152 50 - 200 mg/dL    Triglycerides 59 <=150 mg/dL    HDL, Direct 42 >=40 mg/dL    LDL Calculated 98 0 - 100 mg/dL    Non-HDL-Chol (CHOL-HDL) 110 mg/dl   TSH, 3rd generation with Free T4 reflex    Collection Time: 06/08/21 12:33 PM   Result Value Ref Range    TSH 3RD GENERATON 0 903 0 358 - 3 740 uIU/mL   Uric acid    Collection Time: 06/08/21 12:33 PM   Result Value Ref Range    Uric Acid 4 8 4 2 - 8 0 mg/dL       The following portions of the patient's history were reviewed and updated as appropriate: allergies, current medications, past family history, past medical history, past social history, past surgical history and problem list      Review of Systems   Constitutional: Negative for appetite change, chills, diaphoresis, fatigue, fever and unexpected weight change  HENT: Negative for congestion, hearing loss and rhinorrhea  Eyes: Negative for visual disturbance  Respiratory: Negative for cough, chest tightness, shortness of breath and wheezing  Cardiovascular: Negative for chest pain, palpitations and leg swelling  Gastrointestinal: Negative for abdominal pain and blood in stool  Endocrine: Negative for cold intolerance, heat intolerance, polydipsia and polyuria  Genitourinary: Negative for difficulty urinating, dysuria, frequency and urgency  Musculoskeletal: Negative for arthralgias and myalgias  Skin: Negative for rash  Neurological: Negative for dizziness, weakness, light-headedness and headaches  Hematological: Does not bruise/bleed easily  Psychiatric/Behavioral: Negative for dysphoric mood and sleep disturbance  Objective:      Vitals:    06/08/21 1527   BP: 120/72   Pulse: 68   Temp: 98 5 °F (36 9 °C)   SpO2: 99%          Physical Exam  Constitutional:       Appearance: He is well-developed  HENT:      Head: Normocephalic and atraumatic  Nose: Nose normal    Eyes:      General: No scleral icterus  Conjunctiva/sclera: Conjunctivae normal       Pupils: Pupils are equal, round, and reactive to light  Neck:      Musculoskeletal: Normal range of motion and neck supple  Thyroid: No thyromegaly  Vascular: No JVD  Trachea: No tracheal deviation  Cardiovascular:      Rate and Rhythm: Normal rate and regular rhythm  Heart sounds: No murmur  No friction rub  No gallop  Pulmonary:      Effort: Pulmonary effort is normal  No respiratory distress        Breath sounds: Normal breath sounds  No wheezing or rales  Musculoskeletal:         General: No deformity  Lymphadenopathy:      Cervical: No cervical adenopathy  Skin:     General: Skin is warm and dry  Coloration: Skin is not pale  Findings: No erythema or rash  Neurological:      Mental Status: He is alert and oriented to person, place, and time  Cranial Nerves: No cranial nerve deficit  Psychiatric:         Behavior: Behavior normal          Thought Content:  Thought content normal          Judgment: Judgment normal

## 2021-06-08 NOTE — TELEPHONE ENCOUNTER
Pt didn't get his labs done for his appt today at 3:30pm, he would like lab orders to be changed to stat and will do them at the hospital for his appt today, please advise, call back upon completion

## 2021-06-08 NOTE — PATIENT INSTRUCTIONS
Lab data reviewed in detail and compared prior    Impaired fasting glucose-A1c pending will follow accordingly    Depression and anxiety stable on Wellbutrin and alprazolam, following with Psychiatry who will be retiring  I can  these prescriptions as long as disease process remained stable  Failed back with lumbar and cervical radiculopathy under care of pain management    GERD stable with famotidine    Hypogonadism following with endocrinology on testosterone IM and anastrozole    Routine follow-up after labs in 6 months, sooner as needed  Health maintenance-PSA prior to follow-up, Cologuard due in December, will order at follow-up

## 2021-06-09 DIAGNOSIS — B18.2 CHRONIC HEPATITIS C WITHOUT HEPATIC COMA (HCC): Primary | ICD-10-CM

## 2021-06-09 LAB
HCV AB SER QL: ABNORMAL
HIV 1+2 AB+HIV1 P24 AG SERPL QL IA: NORMAL

## 2021-06-10 ENCOUNTER — TELEPHONE (OUTPATIENT)
Dept: INTERNAL MEDICINE CLINIC | Facility: CLINIC | Age: 66
End: 2021-06-10

## 2021-06-10 ENCOUNTER — TRANSCRIBE ORDERS (OUTPATIENT)
Dept: ADMINISTRATIVE | Facility: HOSPITAL | Age: 66
End: 2021-06-10

## 2021-06-10 ENCOUNTER — APPOINTMENT (OUTPATIENT)
Dept: LAB | Facility: CLINIC | Age: 66
End: 2021-06-10
Payer: MEDICARE

## 2021-06-10 DIAGNOSIS — B18.2 CHRONIC HEPATITIS C WITHOUT HEPATIC COMA (HCC): ICD-10-CM

## 2021-06-10 DIAGNOSIS — E29.1 HYPOGONADISM MALE: Primary | ICD-10-CM

## 2021-06-10 PROCEDURE — 87902 NFCT AGT GNTYP ALYS HEP C: CPT

## 2021-06-10 PROCEDURE — 36415 COLL VENOUS BLD VENIPUNCTURE: CPT

## 2021-06-10 PROCEDURE — 87522 HEPATITIS C REVRS TRNSCRPJ: CPT

## 2021-06-10 NOTE — TELEPHONE ENCOUNTER
Pt had labs done because of his chronic Hep C- ordered by Dr Morena Pozo yesterday  I made an appt for him on Mon 6/14 at 5:45PM    Is that OK with Dr Morena Pozo? He was told to see Dr Morena Pozo right away

## 2021-06-12 LAB
HCV RNA SERPL NAA+PROBE-ACNC: NORMAL IU/ML
TEST INFORMATION: NORMAL

## 2021-06-15 LAB
HCV GENTYP SERPL NAA+PROBE: NORMAL
HCV PLEASE NOTE: NORMAL

## 2021-07-09 ENCOUNTER — APPOINTMENT (OUTPATIENT)
Dept: LAB | Facility: CLINIC | Age: 66
End: 2021-07-09
Payer: MEDICARE

## 2021-07-09 DIAGNOSIS — E29.1 HYPOGONADISM MALE: ICD-10-CM

## 2021-07-09 PROCEDURE — 84403 ASSAY OF TOTAL TESTOSTERONE: CPT

## 2021-07-09 PROCEDURE — 36415 COLL VENOUS BLD VENIPUNCTURE: CPT

## 2021-07-09 PROCEDURE — 84402 ASSAY OF FREE TESTOSTERONE: CPT

## 2021-07-10 LAB
TESTOST FREE SERPL-MCNC: 9.7 PG/ML (ref 6.6–18.1)
TESTOST SERPL-MCNC: 876 NG/DL (ref 264–916)

## 2021-10-08 ENCOUNTER — CLINICAL SUPPORT (OUTPATIENT)
Dept: INTERNAL MEDICINE CLINIC | Facility: CLINIC | Age: 66
End: 2021-10-08
Payer: MEDICARE

## 2021-10-08 DIAGNOSIS — Z23 ENCOUNTER FOR IMMUNIZATION: Primary | ICD-10-CM

## 2021-10-08 PROCEDURE — G0008 ADMIN INFLUENZA VIRUS VAC: HCPCS | Performed by: NURSE PRACTITIONER

## 2021-10-08 PROCEDURE — 90662 IIV NO PRSV INCREASED AG IM: CPT | Performed by: NURSE PRACTITIONER

## 2021-12-27 ENCOUNTER — APPOINTMENT (OUTPATIENT)
Dept: LAB | Facility: CLINIC | Age: 66
End: 2021-12-27
Payer: MEDICARE

## 2021-12-27 ENCOUNTER — OFFICE VISIT (OUTPATIENT)
Dept: INTERNAL MEDICINE CLINIC | Facility: CLINIC | Age: 66
End: 2021-12-27
Payer: MEDICARE

## 2021-12-27 VITALS
WEIGHT: 196.4 LBS | TEMPERATURE: 98.7 F | HEIGHT: 71 IN | BODY MASS INDEX: 27.5 KG/M2 | OXYGEN SATURATION: 99 % | SYSTOLIC BLOOD PRESSURE: 130 MMHG | DIASTOLIC BLOOD PRESSURE: 72 MMHG | HEART RATE: 74 BPM

## 2021-12-27 DIAGNOSIS — R73.01 IMPAIRED FASTING GLUCOSE: ICD-10-CM

## 2021-12-27 DIAGNOSIS — Z12.5 SCREENING FOR PROSTATE CANCER: ICD-10-CM

## 2021-12-27 DIAGNOSIS — E29.1 TESTICULAR HYPOGONADISM: ICD-10-CM

## 2021-12-27 DIAGNOSIS — F41.9 ANXIETY: ICD-10-CM

## 2021-12-27 DIAGNOSIS — K21.9 GASTROESOPHAGEAL REFLUX DISEASE WITHOUT ESOPHAGITIS: Primary | ICD-10-CM

## 2021-12-27 DIAGNOSIS — R97.20 ELEVATED PSA: ICD-10-CM

## 2021-12-27 DIAGNOSIS — Z23 NEED FOR PNEUMOCOCCAL VACCINATION: ICD-10-CM

## 2021-12-27 DIAGNOSIS — F11.20 CONTINUOUS OPIOID DEPENDENCE (HCC): ICD-10-CM

## 2021-12-27 DIAGNOSIS — G89.4 CHRONIC PAIN SYNDROME: ICD-10-CM

## 2021-12-27 DIAGNOSIS — Z79.811 USE OF ANASTROZOLE (ARIMIDEX): ICD-10-CM

## 2021-12-27 DIAGNOSIS — E78.5 DYSLIPIDEMIA: ICD-10-CM

## 2021-12-27 DIAGNOSIS — R01.1 CARDIAC MURMUR: ICD-10-CM

## 2021-12-27 DIAGNOSIS — Z96.89 STATUS POST INSERTION OF SPINAL CORD STIMULATOR: ICD-10-CM

## 2021-12-27 DIAGNOSIS — M96.1 FAILED BACK SYNDROME: ICD-10-CM

## 2021-12-27 LAB
ALBUMIN SERPL BCP-MCNC: 3.9 G/DL (ref 3.5–5)
ALP SERPL-CCNC: 69 U/L (ref 46–116)
ALT SERPL W P-5'-P-CCNC: 32 U/L (ref 12–78)
ANION GAP SERPL CALCULATED.3IONS-SCNC: 1 MMOL/L (ref 4–13)
AST SERPL W P-5'-P-CCNC: 29 U/L (ref 5–45)
BASOPHILS # BLD AUTO: 0.03 THOUSANDS/ΜL (ref 0–0.1)
BASOPHILS NFR BLD AUTO: 1 % (ref 0–1)
BILIRUB SERPL-MCNC: 0.74 MG/DL (ref 0.2–1)
BUN SERPL-MCNC: 16 MG/DL (ref 5–25)
CALCIUM SERPL-MCNC: 10 MG/DL (ref 8.3–10.1)
CHLORIDE SERPL-SCNC: 102 MMOL/L (ref 100–108)
CO2 SERPL-SCNC: 34 MMOL/L (ref 21–32)
CREAT SERPL-MCNC: 0.91 MG/DL (ref 0.6–1.3)
EOSINOPHIL # BLD AUTO: 0.11 THOUSAND/ΜL (ref 0–0.61)
EOSINOPHIL NFR BLD AUTO: 3 % (ref 0–6)
ERYTHROCYTE [DISTWIDTH] IN BLOOD BY AUTOMATED COUNT: 13 % (ref 11.6–15.1)
EST. AVERAGE GLUCOSE BLD GHB EST-MCNC: 108 MG/DL
GFR SERPL CREATININE-BSD FRML MDRD: 87 ML/MIN/1.73SQ M
GLUCOSE P FAST SERPL-MCNC: 96 MG/DL (ref 65–99)
HBA1C MFR BLD: 5.4 %
HCT VFR BLD AUTO: 46.6 % (ref 36.5–49.3)
HGB BLD-MCNC: 15.1 G/DL (ref 12–17)
IMM GRANULOCYTES # BLD AUTO: 0.01 THOUSAND/UL (ref 0–0.2)
IMM GRANULOCYTES NFR BLD AUTO: 0 % (ref 0–2)
LYMPHOCYTES # BLD AUTO: 1.01 THOUSANDS/ΜL (ref 0.6–4.47)
LYMPHOCYTES NFR BLD AUTO: 24 % (ref 14–44)
MCH RBC QN AUTO: 29.6 PG (ref 26.8–34.3)
MCHC RBC AUTO-ENTMCNC: 32.4 G/DL (ref 31.4–37.4)
MCV RBC AUTO: 91 FL (ref 82–98)
MONOCYTES # BLD AUTO: 0.53 THOUSAND/ΜL (ref 0.17–1.22)
MONOCYTES NFR BLD AUTO: 13 % (ref 4–12)
NEUTROPHILS # BLD AUTO: 2.56 THOUSANDS/ΜL (ref 1.85–7.62)
NEUTS SEG NFR BLD AUTO: 59 % (ref 43–75)
NRBC BLD AUTO-RTO: 0 /100 WBCS
PLATELET # BLD AUTO: 216 THOUSANDS/UL (ref 149–390)
PMV BLD AUTO: 10.2 FL (ref 8.9–12.7)
POTASSIUM SERPL-SCNC: 4.2 MMOL/L (ref 3.5–5.3)
PROT SERPL-MCNC: 7.4 G/DL (ref 6.4–8.2)
PSA SERPL-MCNC: 5.8 NG/ML (ref 0–4)
RBC # BLD AUTO: 5.1 MILLION/UL (ref 3.88–5.62)
SODIUM SERPL-SCNC: 137 MMOL/L (ref 136–145)
TSH SERPL DL<=0.05 MIU/L-ACNC: 1.58 UIU/ML (ref 0.36–3.74)
WBC # BLD AUTO: 4.25 THOUSAND/UL (ref 4.31–10.16)

## 2021-12-27 PROCEDURE — 84443 ASSAY THYROID STIM HORMONE: CPT

## 2021-12-27 PROCEDURE — 80053 COMPREHEN METABOLIC PANEL: CPT

## 2021-12-27 PROCEDURE — 90732 PPSV23 VACC 2 YRS+ SUBQ/IM: CPT | Performed by: INTERNAL MEDICINE

## 2021-12-27 PROCEDURE — G0009 ADMIN PNEUMOCOCCAL VACCINE: HCPCS | Performed by: INTERNAL MEDICINE

## 2021-12-27 PROCEDURE — 83036 HEMOGLOBIN GLYCOSYLATED A1C: CPT

## 2021-12-27 PROCEDURE — 85025 COMPLETE CBC W/AUTO DIFF WBC: CPT

## 2021-12-27 PROCEDURE — 36415 COLL VENOUS BLD VENIPUNCTURE: CPT

## 2021-12-27 PROCEDURE — G0103 PSA SCREENING: HCPCS

## 2021-12-27 PROCEDURE — 99214 OFFICE O/P EST MOD 30 MIN: CPT | Performed by: INTERNAL MEDICINE

## 2021-12-27 RX ORDER — DICYCLOMINE HCL 20 MG
TABLET ORAL
COMMUNITY

## 2021-12-27 RX ORDER — CHLORAL HYDRATE 500 MG
1000 CAPSULE ORAL DAILY
Qty: 90 CAPSULE | Refills: 3 | Status: SHIPPED | OUTPATIENT
Start: 2021-12-27

## 2021-12-27 RX ORDER — DIPHENHYDRAMINE HYDROCHLORIDE 25 MG/1
CAPSULE ORAL
COMMUNITY
Start: 2021-10-26 | End: 2022-06-28 | Stop reason: ALTCHOICE

## 2022-01-28 ENCOUNTER — TELEPHONE (OUTPATIENT)
Dept: INTERNAL MEDICINE CLINIC | Facility: CLINIC | Age: 67
End: 2022-01-28

## 2022-01-28 DIAGNOSIS — Z12.11 SCREENING FOR MALIGNANT NEOPLASM OF COLON: Primary | ICD-10-CM

## 2022-01-28 NOTE — TELEPHONE ENCOUNTER
Pt rec'ed notification that he is due for colo guard test  and she is asking that Dr order it    please let her know that it has been ordered and about how long it will take for him to receive it       [phone] (M)

## 2022-02-02 ENCOUNTER — VBI (OUTPATIENT)
Dept: ADMINISTRATIVE | Facility: OTHER | Age: 67
End: 2022-02-02

## 2022-02-18 ENCOUNTER — HOSPITAL ENCOUNTER (OUTPATIENT)
Dept: NON INVASIVE DIAGNOSTICS | Facility: CLINIC | Age: 67
Discharge: HOME | End: 2022-02-18
Payer: MEDICARE

## 2022-02-18 VITALS
DIASTOLIC BLOOD PRESSURE: 72 MMHG | HEIGHT: 71 IN | WEIGHT: 196 LBS | SYSTOLIC BLOOD PRESSURE: 130 MMHG | HEART RATE: 75 BPM | BODY MASS INDEX: 27.44 KG/M2

## 2022-02-18 DIAGNOSIS — R01.1 CARDIAC MURMUR: ICD-10-CM

## 2022-02-18 LAB
AORTIC ROOT: 3.3 CM
AORTIC VALVE MEAN VELOCITY: 9.5 M/S
APICAL FOUR CHAMBER EJECTION FRACTION: 63 %
AV LVOT MEAN GRADIENT: 2 MMHG
AV LVOT PEAK GRADIENT: 4 MMHG
AV MEAN GRADIENT: 4 MMHG
AV PEAK GRADIENT: 7 MMHG
AV VELOCITY RATIO: 0.71
DOP CALC AO PEAK VEL: 1.33 M/S
DOP CALC AO VTI: 28.11 CM
DOP CALC LVOT PEAK VEL VTI: 19.91 CM
DOP CALC LVOT PEAK VEL: 0.95 M/S
E WAVE DECELERATION TIME: 218 MS
FRACTIONAL SHORTENING: 46 % (ref 28–44)
INTERVENTRICULAR SEPTUM IN DIASTOLE (PARASTERNAL SHORT AXIS VIEW): 1.1 CM (ref 0.55–1.02)
INTERVENTRICULAR SEPTUM: 1.1 CM (ref 0.6–1.1)
LAAS-AP2: 25.7 CM2
LAAS-AP4: 25.2 CM2
LEFT ATRIUM AREA SYSTOLE SINGLE PLANE A4C: 23 CM2
LEFT ATRIUM SIZE: 5.1 CM
LEFT INTERNAL DIMENSION IN SYSTOLE: 2.8 CM (ref 3.37–5.1)
LEFT VENTRICULAR INTERNAL DIMENSION IN DIASTOLE: 5.2 CM (ref 5.56–8.29)
LEFT VENTRICULAR POSTERIOR WALL IN END DIASTOLE: 1.3 CM (ref 0.53–1.01)
LEFT VENTRICULAR STROKE VOLUME: 99 ML
LVSV (TEICH): 99 ML
MITRAL REGURGITATION PEAK VELOCITY: 2.75 M/S
MITRAL VALVE MEAN INFLOW VELOCITY: 2.3 M/S
MITRAL VALVE REGURGITANT PEAK GRADIENT: 30 MMHG
MV E'TISSUE VEL-LAT: 21 CM/S
MV E'TISSUE VEL-SEP: 6 CM/S
MV PEAK A VEL: 0.51 M/S
MV PEAK E VEL: 84 CM/S
MV STENOSIS PRESSURE HALF TIME: 63 MS
MV VALVE AREA P 1/2 METHOD: 3.49 CM2
RIGHT ATRIUM AREA SYSTOLE A4C: 15.3 CM2
RIGHT VENTRICLE ID DIMENSION: 3.2 CM
SL CV DOP CALC MV VTI RETROGRADE: 84.5 CM
SL CV LEFT ATRIUM LENGTH A2C: 5.8 CM
SL CV MV MEAN GRADIENT RETROGRADE: 22 MMHG
SL CV PED ECHO LEFT VENTRICLE DIASTOLIC VOLUME (MOD BIPLANE) 2D: 127 ML
SL CV PED ECHO LEFT VENTRICLE SYSTOLIC VOLUME (MOD BIPLANE) 2D: 28 ML
Z-SCORE OF INTERVENTRICULAR SEPTUM IN END DIASTOLE: 2.59
Z-SCORE OF LEFT VENTRICULAR DIMENSION IN END DIASTOLE: -2.67
Z-SCORE OF LEFT VENTRICULAR DIMENSION IN END SYSTOLE: -3.09
Z-SCORE OF LEFT VENTRICULAR POSTERIOR WALL IN END DIASTOLE: 4.34

## 2022-02-18 PROCEDURE — 93306 TTE W/DOPPLER COMPLETE: CPT

## 2022-02-18 PROCEDURE — 93306 TTE W/DOPPLER COMPLETE: CPT | Performed by: INTERNAL MEDICINE

## 2022-02-23 LAB — COLOGUARD RESULT REPORTABLE: NEGATIVE

## 2022-02-24 ENCOUNTER — TELEPHONE (OUTPATIENT)
Dept: INTERNAL MEDICINE CLINIC | Facility: CLINIC | Age: 67
End: 2022-02-24

## 2022-03-17 ENCOUNTER — VBI (OUTPATIENT)
Dept: ADMINISTRATIVE | Facility: OTHER | Age: 67
End: 2022-03-17

## 2022-03-23 ENCOUNTER — EVALUATION (OUTPATIENT)
Dept: OCCUPATIONAL THERAPY | Facility: CLINIC | Age: 67
End: 2022-03-23
Payer: MEDICARE

## 2022-03-23 DIAGNOSIS — S52.502D TRAUMATIC CLOSED DISPLACED FRACTURE OF DISTAL END OF RADIUS AND ULNA, LEFT, WITH ROUTINE HEALING, SUBSEQUENT ENCOUNTER: Primary | ICD-10-CM

## 2022-03-23 DIAGNOSIS — S52.602D TRAUMATIC CLOSED DISPLACED FRACTURE OF DISTAL END OF RADIUS AND ULNA, LEFT, WITH ROUTINE HEALING, SUBSEQUENT ENCOUNTER: Primary | ICD-10-CM

## 2022-03-23 PROCEDURE — 97110 THERAPEUTIC EXERCISES: CPT | Performed by: OCCUPATIONAL THERAPIST

## 2022-03-23 PROCEDURE — 97165 OT EVAL LOW COMPLEX 30 MIN: CPT | Performed by: OCCUPATIONAL THERAPIST

## 2022-03-23 NOTE — PROGRESS NOTES
OT Evaluation     Today's date: 3/23/2022  Patient name: Jarred Yanes  : 1955  MRN: [id number]  Referring provider: Mary Huff MD  Dx:   Encounter Diagnosis     ICD-10-CM    1  Traumatic closed displaced fracture of distal end of radius and ulna, left, with routine healing, subsequent encounter  S52 502D     S52 602D                   Assessment  Assessment details: Sydney Barclay is a 78 y/o right handed male who fractured his left radius and ulna shaft  He fractured his arm slipping on icy stairs outside his home on      He went to ED same date and was referred to an orthopedic  He was casted for about 2 months with casting discontinued about 3 weeks ago  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  Examination reveals functional motion of the elbow, forearm, and wrist   Pain complaint absent today  Pain has occurred after playing drums for several hours  Good  strength recorded  No sensory complaints  No edema  Evaluation is of low complexity, due to minimal comorbidities and stable clinical presentation  Pt demonstrates good tolerance of therapy today and was provided with a written HEP focusing on forearm and wrist PRE's and he was instructed to perform exercises daily  The patient demonstrates HEP instructions appropriately, verbalizes understanding, and is in agreement with the written HEP  Pt will benefit from skilled OT intervention to progress strength  and allow return to PLOF  Impairments: impaired physical strength and lacks appropriate home exercise program    Symptom irritability: lowUnderstanding of Dx/Px/POC: excellent  Goals  STG/LTG- [place] in HEP for FA and wrist PRE's  One time visit only  Plan  Plan details: Patient demonstrates functional motion and strength and reports no pain today  One time visit only for PRE program for FA and wrist strengthening with progression      Patient would benefit from: OT rayshawn and skilled occupational therapy  Planned therapy interventions: home exercise program and patient education  Duration in visits: 1  Plan of Care beginning date: 3/23/2022  Plan of Care expiration date: 3/23/2022  Treatment plan discussed with: patient        Subjective Evaluation    History of Present Illness  Mechanism of injury: trauma  Mechanism of injury: Left FA fractured, slipped outdoors, down steps falling on left arm  Not a recurrent problem   Quality of life: good    Pain  Current pain ratin  Quality: tight    Social Support  Lives with: spouse    Hand dominance: right    Treatments  Previous treatment: immobilization  Current treatment: occupational therapy  Patient Goals  Patient goals for therapy: increased motion, increased strength and return to sport/leisure activities          Objective     Active Range of Motion     Left Elbow   Normal active range of motion  Forearm supination: 90 degrees   Forearm pronation: 75 degrees     Right Elbow   Normal active range of motion  Forearm supination: 90 degrees   Forearm pronation: 75 degrees     Left Wrist   Wrist flexion: 60 degrees   Wrist extension: 55 degrees     Right Wrist   Normal active range of motion  Wrist flexion: 65 degrees   Wrist extension: 55 degrees     Strength/Myotome Testing     Left Wrist/Hand   Normal wrist strength    Right Wrist/Hand   Normal wrist strength    Additional Strength Details  Andrade #2  L  70 lbs,  R  67 lbs                Precautions:  Universal       Manuals 3/23                                                                Neuro Re-Ed                                                                                                        Ther Ex 15'            HEP FA and wrist PRE E/F/  RD                                                                                                       Ther Activity                                       Gait Training                                       Modalities

## 2022-03-23 NOTE — LETTER
2022    Mildred Lewis MD  [address] [address]    Patient: Meghana Culver   YOB: 1955   Date of Visit: 3/23/2022     Encounter Diagnosis     ICD-10-CM    1  Traumatic closed displaced fracture of distal end of radius and ulna, left, with routine healing, subsequent encounter  D42 775L     A01 898Q        Dear Dr Juan Shane: Thank you for your recent referral of Meghana Culver  Please review the attached evaluation summary from Maximo's recent visit  Please verify that you agree with the plan of care by signing the attached order  If you have any questions or concerns, please do not hesitate to call  I sincerely appreciate the opportunity to share in the care of one of your patients and hope to have another opportunity to work with you in the near future  Sincerely,    Izabella Martel OT      Referring Provider:     I certify that I have read the below Plan of Care and certify the need for these services furnished under this plan of treatment while under my care  Mildred Lewis MD  [address] [address]  Via Fax: [phone]        OT Evaluation     Today's date: 3/23/2022  Patient name: Meghana Culver  : 1955  MRN: [id number]  Referring provider: Michelle Mcmullen MD  Dx:   Encounter Diagnosis     ICD-10-CM    1  Traumatic closed displaced fracture of distal end of radius and ulna, left, with routine healing, subsequent encounter  S52 502D     S52 602D                   Assessment  Assessment details: Troy Oneill is a 80 y/o right handed male who fractured his left radius and ulna shaft  He fractured his arm slipping on icy stairs outside his home on      He went to ED same date and was referred to an orthopedic  He was casted for about 2 months with casting discontinued about 3 weeks ago  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 [employer]        Examination reveals functional motion of the elbow, forearm, and wrist   Pain complaint absent today  Pain has occurred after playing drums for several hours  Good  strength recorded  No sensory complaints  No edema  Evaluation is of low complexity, due to minimal comorbidities and stable clinical presentation  Pt demonstrates good tolerance of therapy today and was provided with a written HEP focusing on forearm and wrist PRE's and he was instructed to perform exercises daily  The patient demonstrates HEP instructions appropriately, verbalizes understanding, and is in agreement with the written HEP  Pt will benefit from skilled OT intervention to progress strength  and allow return to PLOF  Impairments: impaired physical strength and lacks appropriate home exercise program    Symptom irritability: lowUnderstanding of Dx/Px/POC: excellent  Goals  STG/LTG- [place] in HEP for FA and wrist PRE's  One time visit only  Plan  Plan details: Patient demonstrates functional motion and strength and reports no pain today  One time visit only for PRE program for FA and wrist strengthening with progression  Patient would benefit from: OT eval and skilled occupational therapy  Planned therapy interventions: home exercise program and patient education  Duration in visits: 1  Plan of Care beginning date: 3/23/2022  Plan of Care expiration date: 3/23/2022  Treatment plan discussed with: patient        Subjective Evaluation    History of Present Illness  Mechanism of injury: trauma  Mechanism of injury: Left FA fractured, slipped outdoors, down steps falling on left arm              Not a recurrent problem   Quality of life: good    Pain  Current pain ratin  Quality: tight    Social Support  Lives with: spouse    Hand dominance: right    Treatments  Previous treatment: immobilization  Current treatment: occupational therapy  Patient Goals  Patient goals for therapy: increased motion, increased strength and return to sport/leisure activities          Objective     Active Range of Motion     Left Elbow   Normal active range of motion  Forearm supination: 90 degrees   Forearm pronation: 75 degrees     Right Elbow   Normal active range of motion  Forearm supination: 90 degrees   Forearm pronation: 75 degrees     Left Wrist   Wrist flexion: 60 degrees   Wrist extension: 55 degrees     Right Wrist   Normal active range of motion  Wrist flexion: 65 degrees   Wrist extension: 55 degrees     Strength/Myotome Testing     Left Wrist/Hand   Normal wrist strength    Right Wrist/Hand   Normal wrist strength    Additional Strength Details  Andrade #2  L  70 lbs,  R  67 lbs                Precautions:  Universal       Manuals 3/23                                                                Neuro Re-Ed                                                                                                        Ther Ex 15'            HEP FA and wrist PRE E/F/  RD                                                                                                       Ther Activity                                       Gait Training                                       Modalities

## 2022-05-17 ENCOUNTER — TELEPHONE (OUTPATIENT)
Dept: INTERNAL MEDICINE CLINIC | Facility: CLINIC | Age: 67
End: 2022-05-17

## 2022-05-17 NOTE — TELEPHONE ENCOUNTER
Called pt   And started on Thursday 5/12 with burning throat and now has moved to his nose and just has tiredness no fevers 98 8

## 2022-05-17 NOTE — TELEPHONE ENCOUNTER
PT  TESTED  POSITIVE  FOR  COVID  WANTS  TO KNOW  IF  HE  CAN  GET  RX  [facility]    [place]  WASN'T   SURE  OF  THE  SPELLING   [place]  SAID  SHE  TALK  TO  Brandon Galicia  ABOUT  THIS  RX  BEFORE   WANTS  THE  RX   SENT  TO     [facility] SAID  THEY  ARE  LEAVING   TOWN  AT  1;00 today  WANTS  TO  KNOW  IF  THIS  CAN  BE  TAKEN  CARE  OF  ASAP

## 2022-05-17 NOTE — TELEPHONE ENCOUNTER
I spoke to pt he said that he had sore throat and his nose burning for 3-4 days now and yesterday it became worse no fever no coughing no diarrhea and he is still leaving town going to [id number] his son is graduating

## 2022-06-16 ENCOUNTER — APPOINTMENT (OUTPATIENT)
Dept: LAB | Facility: CLINIC | Age: 67
End: 2022-06-16
Payer: MEDICARE

## 2022-06-16 DIAGNOSIS — R97.20 ELEVATED PSA: ICD-10-CM

## 2022-06-16 DIAGNOSIS — Z79.811 USE OF ANASTROZOLE (ARIMIDEX): ICD-10-CM

## 2022-06-16 DIAGNOSIS — R73.01 IMPAIRED FASTING GLUCOSE: ICD-10-CM

## 2022-06-16 DIAGNOSIS — E78.5 DYSLIPIDEMIA: ICD-10-CM

## 2022-06-16 LAB
ALBUMIN SERPL BCP-MCNC: 3.5 G/DL (ref 3.5–5)
ALP SERPL-CCNC: 71 U/L (ref 46–116)
ALT SERPL W P-5'-P-CCNC: 24 U/L (ref 12–78)
ANION GAP SERPL CALCULATED.3IONS-SCNC: 0 MMOL/L (ref 4–13)
AST SERPL W P-5'-P-CCNC: 19 U/L (ref 5–45)
BASOPHILS # BLD AUTO: 0.03 THOUSANDS/ΜL (ref 0–0.1)
BASOPHILS NFR BLD AUTO: 1 % (ref 0–1)
BILIRUB SERPL-MCNC: 0.62 MG/DL (ref 0.2–1)
BUN SERPL-MCNC: 20 MG/DL (ref 5–25)
CALCIUM SERPL-MCNC: 9.1 MG/DL (ref 8.3–10.1)
CHLORIDE SERPL-SCNC: 106 MMOL/L (ref 100–108)
CHOLEST SERPL-MCNC: 148 MG/DL
CO2 SERPL-SCNC: 34 MMOL/L (ref 21–32)
CREAT SERPL-MCNC: 0.88 MG/DL (ref 0.6–1.3)
EOSINOPHIL # BLD AUTO: 0.1 THOUSAND/ΜL (ref 0–0.61)
EOSINOPHIL NFR BLD AUTO: 3 % (ref 0–6)
ERYTHROCYTE [DISTWIDTH] IN BLOOD BY AUTOMATED COUNT: 13.6 % (ref 11.6–15.1)
EST. AVERAGE GLUCOSE BLD GHB EST-MCNC: 108 MG/DL
GFR SERPL CREATININE-BSD FRML MDRD: 88 ML/MIN/1.73SQ M
GLUCOSE P FAST SERPL-MCNC: 111 MG/DL (ref 65–99)
HBA1C MFR BLD: 5.4 %
HCT VFR BLD AUTO: 46.5 % (ref 36.5–49.3)
HDLC SERPL-MCNC: 39 MG/DL
HGB BLD-MCNC: 14.9 G/DL (ref 12–17)
IMM GRANULOCYTES # BLD AUTO: 0 THOUSAND/UL (ref 0–0.2)
IMM GRANULOCYTES NFR BLD AUTO: 0 % (ref 0–2)
LDLC SERPL CALC-MCNC: 95 MG/DL (ref 0–100)
LYMPHOCYTES # BLD AUTO: 1.06 THOUSANDS/ΜL (ref 0.6–4.47)
LYMPHOCYTES NFR BLD AUTO: 36 % (ref 14–44)
MCH RBC QN AUTO: 29.1 PG (ref 26.8–34.3)
MCHC RBC AUTO-ENTMCNC: 32 G/DL (ref 31.4–37.4)
MCV RBC AUTO: 91 FL (ref 82–98)
MONOCYTES # BLD AUTO: 0.35 THOUSAND/ΜL (ref 0.17–1.22)
MONOCYTES NFR BLD AUTO: 12 % (ref 4–12)
NEUTROPHILS # BLD AUTO: 1.42 THOUSANDS/ΜL (ref 1.85–7.62)
NEUTS SEG NFR BLD AUTO: 48 % (ref 43–75)
NONHDLC SERPL-MCNC: 109 MG/DL
NRBC BLD AUTO-RTO: 0 /100 WBCS
PLATELET # BLD AUTO: 185 THOUSANDS/UL (ref 149–390)
PMV BLD AUTO: 11 FL (ref 8.9–12.7)
POTASSIUM SERPL-SCNC: 4.6 MMOL/L (ref 3.5–5.3)
PROT SERPL-MCNC: 7 G/DL (ref 6.4–8.2)
PSA SERPL-MCNC: 4.4 NG/ML (ref 0–4)
RBC # BLD AUTO: 5.12 MILLION/UL (ref 3.88–5.62)
SODIUM SERPL-SCNC: 140 MMOL/L (ref 136–145)
TRIGL SERPL-MCNC: 70 MG/DL
TSH SERPL DL<=0.05 MIU/L-ACNC: 1.51 UIU/ML (ref 0.45–4.5)
WBC # BLD AUTO: 2.96 THOUSAND/UL (ref 4.31–10.16)

## 2022-06-16 PROCEDURE — 80061 LIPID PANEL: CPT

## 2022-06-16 PROCEDURE — 84443 ASSAY THYROID STIM HORMONE: CPT

## 2022-06-16 PROCEDURE — 83036 HEMOGLOBIN GLYCOSYLATED A1C: CPT

## 2022-06-16 PROCEDURE — 36415 COLL VENOUS BLD VENIPUNCTURE: CPT

## 2022-06-16 PROCEDURE — 80053 COMPREHEN METABOLIC PANEL: CPT

## 2022-06-16 PROCEDURE — 85025 COMPLETE CBC W/AUTO DIFF WBC: CPT

## 2022-06-16 PROCEDURE — 84153 ASSAY OF PSA TOTAL: CPT

## 2023-01-09 ENCOUNTER — TELEPHONE (OUTPATIENT)
Dept: INTERNAL MEDICINE CLINIC | Facility: CLINIC | Age: 68
End: 2023-01-09

## 2023-01-09 DIAGNOSIS — Z00.00 WELL ADULT EXAM: Primary | ICD-10-CM

## 2023-01-09 DIAGNOSIS — Z12.5 SCREENING FOR PROSTATE CANCER: ICD-10-CM

## 2023-01-09 NOTE — TELEPHONE ENCOUNTER
Pt has an appt next Wed with Dr Nas James    Does the dr want him to do lab work  Call when the orders are in

## 2023-01-14 ENCOUNTER — APPOINTMENT (OUTPATIENT)
Dept: LAB | Facility: CLINIC | Age: 68
End: 2023-01-14

## 2023-01-14 DIAGNOSIS — Z12.5 SCREENING FOR PROSTATE CANCER: ICD-10-CM

## 2023-01-14 DIAGNOSIS — Z00.00 WELL ADULT EXAM: ICD-10-CM

## 2023-01-14 LAB
ALBUMIN SERPL BCP-MCNC: 3.7 G/DL (ref 3.5–5)
ALP SERPL-CCNC: 80 U/L (ref 46–116)
ALT SERPL W P-5'-P-CCNC: 23 U/L (ref 12–78)
ANION GAP SERPL CALCULATED.3IONS-SCNC: 8 MMOL/L (ref 4–13)
AST SERPL W P-5'-P-CCNC: 19 U/L (ref 5–45)
BASOPHILS # BLD AUTO: 0.05 THOUSANDS/ÂΜL (ref 0–0.1)
BASOPHILS NFR BLD AUTO: 1 % (ref 0–1)
BILIRUB SERPL-MCNC: 0.4 MG/DL (ref 0.2–1)
BUN SERPL-MCNC: 21 MG/DL (ref 5–25)
CALCIUM SERPL-MCNC: 8.6 MG/DL (ref 8.3–10.1)
CHLORIDE SERPL-SCNC: 103 MMOL/L (ref 96–108)
CHOLEST SERPL-MCNC: 155 MG/DL
CO2 SERPL-SCNC: 30 MMOL/L (ref 21–32)
CREAT SERPL-MCNC: 0.84 MG/DL (ref 0.6–1.3)
EOSINOPHIL # BLD AUTO: 0.08 THOUSAND/ÂΜL (ref 0–0.61)
EOSINOPHIL NFR BLD AUTO: 2 % (ref 0–6)
ERYTHROCYTE [DISTWIDTH] IN BLOOD BY AUTOMATED COUNT: 12.5 % (ref 11.6–15.1)
EST. AVERAGE GLUCOSE BLD GHB EST-MCNC: 105 MG/DL
GFR SERPL CREATININE-BSD FRML MDRD: 90 ML/MIN/1.73SQ M
GLUCOSE P FAST SERPL-MCNC: 101 MG/DL (ref 65–99)
HBA1C MFR BLD: 5.3 %
HCT VFR BLD AUTO: 44.8 % (ref 36.5–49.3)
HDLC SERPL-MCNC: 50 MG/DL
HGB BLD-MCNC: 15 G/DL (ref 12–17)
IMM GRANULOCYTES # BLD AUTO: 0 THOUSAND/UL (ref 0–0.2)
IMM GRANULOCYTES NFR BLD AUTO: 0 % (ref 0–2)
LDLC SERPL CALC-MCNC: 89 MG/DL (ref 0–100)
LYMPHOCYTES # BLD AUTO: 1.3 THOUSANDS/ÂΜL (ref 0.6–4.47)
LYMPHOCYTES NFR BLD AUTO: 37 % (ref 14–44)
MCH RBC QN AUTO: 30.2 PG (ref 26.8–34.3)
MCHC RBC AUTO-ENTMCNC: 33.5 G/DL (ref 31.4–37.4)
MCV RBC AUTO: 90 FL (ref 82–98)
MONOCYTES # BLD AUTO: 0.47 THOUSAND/ÂΜL (ref 0.17–1.22)
MONOCYTES NFR BLD AUTO: 13 % (ref 4–12)
NEUTROPHILS # BLD AUTO: 1.61 THOUSANDS/ÂΜL (ref 1.85–7.62)
NEUTS SEG NFR BLD AUTO: 47 % (ref 43–75)
NONHDLC SERPL-MCNC: 105 MG/DL
NRBC BLD AUTO-RTO: 0 /100 WBCS
PLATELET # BLD AUTO: 224 THOUSANDS/UL (ref 149–390)
PMV BLD AUTO: 10.1 FL (ref 8.9–12.7)
POTASSIUM SERPL-SCNC: 4.2 MMOL/L (ref 3.5–5.3)
PROT SERPL-MCNC: 6.7 G/DL (ref 6.4–8.4)
PSA SERPL-MCNC: 5.6 NG/ML (ref 0–4)
RBC # BLD AUTO: 4.96 MILLION/UL (ref 3.88–5.62)
SODIUM SERPL-SCNC: 141 MMOL/L (ref 135–147)
TRIGL SERPL-MCNC: 82 MG/DL
TSH SERPL DL<=0.05 MIU/L-ACNC: 1.55 UIU/ML (ref 0.45–4.5)
WBC # BLD AUTO: 3.51 THOUSAND/UL (ref 4.31–10.16)

## 2023-01-18 ENCOUNTER — APPOINTMENT (OUTPATIENT)
Dept: LAB | Facility: CLINIC | Age: 68
End: 2023-01-18

## 2023-01-18 ENCOUNTER — OFFICE VISIT (OUTPATIENT)
Dept: INTERNAL MEDICINE CLINIC | Facility: CLINIC | Age: 68
End: 2023-01-18

## 2023-01-18 VITALS
TEMPERATURE: 97 F | SYSTOLIC BLOOD PRESSURE: 130 MMHG | DIASTOLIC BLOOD PRESSURE: 80 MMHG | OXYGEN SATURATION: 99 % | WEIGHT: 189 LBS | HEART RATE: 80 BPM | BODY MASS INDEX: 26.46 KG/M2 | RESPIRATION RATE: 16 BRPM | HEIGHT: 71 IN

## 2023-01-18 DIAGNOSIS — N13.8 ENLARGED PROSTATE WITH URINARY OBSTRUCTION: ICD-10-CM

## 2023-01-18 DIAGNOSIS — N32.0 SYMPTOM OF BLADDER OUTLET OBSTRUCTION: ICD-10-CM

## 2023-01-18 DIAGNOSIS — G89.4 CHRONIC PAIN SYNDROME: ICD-10-CM

## 2023-01-18 DIAGNOSIS — K21.9 GASTROESOPHAGEAL REFLUX DISEASE WITHOUT ESOPHAGITIS: ICD-10-CM

## 2023-01-18 DIAGNOSIS — N40.1 BPH ASSOCIATED WITH NOCTURIA: ICD-10-CM

## 2023-01-18 DIAGNOSIS — N40.1 ENLARGED PROSTATE WITH URINARY OBSTRUCTION: ICD-10-CM

## 2023-01-18 DIAGNOSIS — F11.20 CONTINUOUS OPIOID DEPENDENCE (HCC): ICD-10-CM

## 2023-01-18 DIAGNOSIS — R35.1 BPH ASSOCIATED WITH NOCTURIA: ICD-10-CM

## 2023-01-18 DIAGNOSIS — R97.20 ELEVATED PSA: ICD-10-CM

## 2023-01-18 DIAGNOSIS — F41.9 ANXIETY: ICD-10-CM

## 2023-01-18 DIAGNOSIS — E29.1 TESTICULAR HYPOGONADISM: ICD-10-CM

## 2023-01-18 DIAGNOSIS — E29.1 MALE HYPOGONADISM: ICD-10-CM

## 2023-01-18 DIAGNOSIS — Z00.00 MEDICARE ANNUAL WELLNESS VISIT, SUBSEQUENT: ICD-10-CM

## 2023-01-18 DIAGNOSIS — R73.01 IMPAIRED FASTING GLUCOSE: ICD-10-CM

## 2023-01-18 DIAGNOSIS — N32.0 SYMPTOM OF BLADDER OUTLET OBSTRUCTION: Primary | ICD-10-CM

## 2023-01-18 LAB — 25(OH)D3 SERPL-MCNC: 29.3 NG/ML (ref 30–100)

## 2023-01-18 RX ORDER — TAMSULOSIN HYDROCHLORIDE 0.4 MG/1
0.4 CAPSULE ORAL
Qty: 30 CAPSULE | Refills: 2 | Status: SHIPPED | OUTPATIENT
Start: 2023-01-18 | End: 2023-04-18

## 2023-01-18 NOTE — ASSESSMENT & PLAN NOTE
Reporting Sx of CARSON - poor urinary stream, hesitancy and daytime frequency  On testosterone therapy for low testosterone  PSA of 5 6  Will check UA to rule out infection  Follow up with Dr Carmelo Nieves  Will start Flomax  - Will send to preferred pharmacy  Advised on medication side effect - postural hypotension, reverse ejaculation

## 2023-01-18 NOTE — PATIENT INSTRUCTIONS
Medicare Preventive Visit Patient Instructions  Thank you for completing your Welcome to Medicare Visit or Medicare Annual Wellness Visit today  Your next wellness visit will be due in one year (1/19/2024)  The screening/preventive services that you may require over the next 5-10 years are detailed below  Some tests may not apply to you based off risk factors and/or age  Screening tests ordered at today's visit but not completed yet may show as past due  Also, please note that scanned in results may not display below  Preventive Screenings:  Service Recommendations Previous Testing/Comments   Colorectal Cancer Screening  · Colonoscopy    · Fecal Occult Blood Test (FOBT)/Fecal Immunochemical Test (FIT)  · Fecal DNA/Cologuard Test  · Flexible Sigmoidoscopy Age: 39-70 years old   Colonoscopy: every 10 years (May be performed more frequently if at higher risk)  OR  FOBT/FIT: every 1 year  OR  Cologuard: every 3 years  OR  Sigmoidoscopy: every 5 years  Screening may be recommended earlier than age 39 if at higher risk for colorectal cancer  Also, an individualized decision between you and your healthcare provider will decide whether screening between the ages of 74-80 would be appropriate   Colonoscopy: 02/15/2022  FOBT/FIT: Not on file  Cologuard: 02/15/2022  Sigmoidoscopy: Not on file    Screening Current     Prostate Cancer Screening Individualized decision between patient and health care provider in men between ages of 53-78   Medicare will cover every 12 months beginning on the day after your 50th birthday PSA: 5 6 ng/mL     Screening Current     Hepatitis C Screening Once for adults born between 1945 and 1965  More frequently in patients at high risk for Hepatitis C Hep C Antibody: 06/10/2021    Screening Current   Diabetes Screening 1-2 times per year if you're at risk for diabetes or have pre-diabetes Fasting glucose: 101 mg/dL (1/14/2023)  A1C: 5 3 % (1/14/2023)  Screening Current   Cholesterol Screening Once every 5 years if you don't have a lipid disorder  May order more often based on risk factors  Lipid panel: 01/14/2023  Screening Current      Other Preventive Screenings Covered by Medicare:  1  Abdominal Aortic Aneurysm (AAA) Screening: covered once if your at risk  You're considered to be at risk if you have a family history of AAA or a male between the age of 73-68 who smoking at least 100 cigarettes in your lifetime  2  Lung Cancer Screening: covers low dose CT scan once per year if you meet all of the following conditions: (1) Age 50-69; (2) No signs or symptoms of lung cancer; (3) Current smoker or have quit smoking within the last 15 years; (4) You have a tobacco smoking history of at least 20 pack years (packs per day x number of years you smoked); (5) You get a written order from a healthcare provider  3  Glaucoma Screening: covered annually if you're considered high risk: (1) You have diabetes OR (2) Family history of glaucoma OR (3)  aged 48 and older OR (3)  American aged 72 and older  3  Osteoporosis Screening: covered every 2 years if you meet one of the following conditions: (1) Have a vertebral abnormality; (2) On glucocorticoid therapy for more than 3 months; (3) Have primary hyperparathyroidism; (4) On osteoporosis medications and need to assess response to drug therapy  5  HIV Screening: covered annually if you're between the age of 12-76  Also covered annually if you are younger than 13 and older than 72 with risk factors for HIV infection  For pregnant patients, it is covered up to 3 times per pregnancy      Immunizations:  Immunization Recommendations   Influenza Vaccine Annual influenza vaccination during flu season is recommended for all persons aged >= 6 months who do not have contraindications   Pneumococcal Vaccine   * Pneumococcal conjugate vaccine = PCV13 (Prevnar 13), PCV15 (Vaxneuvance), PCV20 (Prevnar 20)  * Pneumococcal polysaccharide vaccine = PPSV23 (Pneumovax) Adults 2364 years old: 1-3 doses may be recommended based on certain risk factors  Adults 72 years old: 1-2 doses may be recommended based off what pneumonia vaccine you previously received   Hepatitis B Vaccine 3 dose series if at intermediate or high risk (ex: diabetes, end stage renal disease, liver disease)   Tetanus (Td) Vaccine - COST NOT COVERED BY MEDICARE PART B Following completion of primary series, a booster dose should be given every 10 years to maintain immunity against tetanus  Td may also be given as tetanus wound prophylaxis  Tdap Vaccine - COST NOT COVERED BY MEDICARE PART B Recommended at least once for all adults  For pregnant patients, recommended with each pregnancy  Shingles Vaccine (Shingrix) - COST NOT COVERED BY MEDICARE PART B  2 shot series recommended in those aged 48 and above     Health Maintenance Due:      Topic Date Due   • Colorectal Cancer Screening  02/15/2025   • Hepatitis C Screening  Completed     Immunizations Due:      Topic Date Due   • COVID-19 Vaccine (5 - Booster for Pfizer series) 06/17/2022   • Pneumococcal Vaccine: 65+ Years (2 - PCV) 12/27/2022     Advance Directives   What are advance directives? Advance directives are legal documents that state your wishes and plans for medical care  These plans are made ahead of time in case you lose your ability to make decisions for yourself  Advance directives can apply to any medical decision, such as the treatments you want, and if you want to donate organs  What are the types of advance directives? There are many types of advance directives, and each state has rules about how to use them  You may choose a combination of any of the following:  · Living will: This is a written record of the treatment you want  You can also choose which treatments you do not want, which to limit, and which to stop at a certain time  This includes surgery, medicine, IV fluid, and tube feedings     · Durable power of  for [facility]): This is a written record that states who you want to make healthcare choices for you when you are unable to make them for yourself  This person, called a proxy, is usually a family member or a friend  You may choose more than 1 proxy  · Do not resuscitate (DNR) order:  A DNR order is used in case your heart stops beating or you stop breathing  It is a request not to have certain forms of treatment, such as CPR  A DNR order may be included in other types of advance directives  · Medical directive: This covers the care that you want if you are in a coma, near death, or unable to make decisions for yourself  You can list the treatments you want for each condition  Treatment may include pain medicine, surgery, blood transfusions, dialysis, IV or tube feedings, and a ventilator (breathing machine)  · Values history: This document has questions about your views, beliefs, and how you feel and think about life  This information can help others choose the care that you would choose  Why are advance directives important? An advance directive helps you control your care  Although spoken wishes may be used, it is better to have your wishes written down  Spoken wishes can be misunderstood, or not followed  Treatments may be given even if you do not want them  An advance directive may make it easier for your family to make difficult choices about your care  Fall Prevention    Fall prevention  includes ways to make your home and other areas safer  It also includes ways you can move more carefully to prevent a fall  Health conditions that cause changes in your blood pressure, vision, or muscle strength and coordination may increase your risk for falls  Medicines may also increase your risk for falls if they make you dizzy, weak, or sleepy  Fall prevention tips:   · Stand or sit up slowly  · Use assistive devices as directed  · Wear shoes that fit well and have soles that   · Wear a personal alarm  · Stay active  · Manage your medical conditions  Home Safety Tips:  · Add items to prevent falls in the bathroom  · Keep paths clear  · Install bright lights in your home  · Keep items you use often on shelves within reach  · Paint or place reflective tape on the edges of your stairs  Weight Management   Why it is important to manage your weight:  Being overweight increases your risk of health conditions such as heart disease, high blood pressure, type 2 diabetes, and certain types of cancer  It can also increase your risk for osteoarthritis, sleep apnea, and other respiratory problems  Aim for a slow, steady weight loss  Even a small amount of weight loss can lower your risk of health problems  How to lose weight safely:  A safe and healthy way to lose weight is to eat fewer calories and get regular exercise  You can lose up about 1 pound a week by decreasing the number of calories you eat by 500 calories each day  Healthy meal plan for weight management:  A healthy meal plan includes a variety of foods, contains fewer calories, and helps you stay healthy  A healthy meal plan includes the following:  · Eat whole-grain foods more often  A healthy meal plan should contain fiber  Fiber is the part of grains, fruits, and vegetables that is not broken down by your body  Whole-grain foods are healthy and provide extra fiber in your diet  Some examples of whole-grain foods are whole-wheat breads and pastas, oatmeal, brown rice, and bulgur  · Eat a variety of vegetables every day  Include dark, leafy greens such as spinach, kale, yosvany greens, and mustard greens  Eat yellow and orange vegetables such as carrots, sweet potatoes, and winter squash  · Eat a variety of fruits every day  Choose fresh or canned fruit (canned in its own juice or light syrup) instead of juice  Fruit juice has very little or no fiber  · Eat low-fat dairy foods    Drink fat-free (skim) milk or 1% milk  Eat fat-free yogurt and low-fat cottage cheese  Try low-fat cheeses such as mozzarella and other reduced-fat cheeses  · Choose meat and other protein foods that are low in fat  Choose beans or other legumes such as split peas or lentils  Choose fish, skinless poultry (chicken or turkey), or lean cuts of red meat (beef or pork)  Before you cook meat or poultry, cut off any visible fat  · Use less fat and oil  Try baking foods instead of frying them  Add less fat, such as margarine, sour cream, regular salad dressing and mayonnaise to foods  Eat fewer high-fat foods  Some examples of high-fat foods include french fries, doughnuts, ice cream, and cakes  · Eat fewer sweets  Limit foods and drinks that are high in sugar  This includes candy, cookies, regular soda, and sweetened drinks  Exercise:  Exercise at least 30 minutes per day on most days of the week  Some examples of exercise include walking, biking, dancing, and swimming  You can also fit in more physical activity by taking the stairs instead of the elevator or parking farther away from stores  Ask your healthcare provider about the best exercise plan for you  © Copyright [employer] 2018 Information is for End User's use only and may not be sold, redistributed or otherwise used for commercial purposes   All illustrations and images included in CareNotes® are the copyrighted property of [employer]  or [address] [place] [address]

## 2023-01-18 NOTE — PROGRESS NOTES
Assessment and Plan:     Problem List Items Addressed This Visit        Digestive    Gastroesophageal reflux disease without esophagitis       Endocrine    Testicular hypogonadism    Impaired fasting glucose    Relevant Orders    CBC and differential    Comprehensive metabolic panel       Other    Anxiety    Chronic pain syndrome    Continuous opioid dependence ([address] )     [facility]  With morphine pump all the time  Continue follow up with Pain mgt         Elevated PSA     PSA for 5 6  On testosterone therapy  Relevant Orders    Comprehensive metabolic panel    Symptom of bladder outlet obstruction - Primary     Reporting Sx of CARSON - poor urinary stream, hesitancy and daytime frequency  On testosterone therapy for low testosterone  PSA of 5 6  Will check UA to rule out infection  Follow up with Dr Wendi Cunha  Will start Flomax  - Will send to preferred pharmacy  Advised on medication side effect - postural hypotension, reverse ejaculation  Relevant Medications    tamsulosin (FLOMAX) 0 4 mg    Other Relevant Orders    UA w Reflex to Microscopic w Reflex to Culture -Lab Collect   Other Visit Diagnoses     Medicare annual wellness visit, subsequent        Relevant Orders    CBC and differential    Comprehensive metabolic panel           Preventive health issues were discussed with patient, and age appropriate screening tests were ordered as noted in patient's After Visit Summary  Personalized health advice and appropriate referrals for health education or preventive services given if needed, as noted in patient's After Visit Summary  History of Present Illness:     Patient presents for a Medicare Wellness Visit    Here for follow up and annual visit    Report reduced urine stream following cycling exercise  Resolves when he stopped cycling about 2 weeks ago but it restarted when he went back yesterday  Bike rides last about 15 mins - 3 miles   Duration of complaint roughly about 1 year ago ( started after he had a flare of back pain requiring transforaminal injections and when he completed the subsequent prednisone therapy  PSA: 5 6 , which is increased from prior of 4 4  He states to be testosterone injection every 4 days -these could potentially be contributing to CARSON symptoms  Pain is well controlled  Rate as it a 2/10  Reports urinary frequency  Denies nocturia  Has followed up with Dr Renetta Singleton, urologist in the past   He is open to follow up for current urinary symptoms  Denies depressive mood    Lab results reviewed  Follow-up serum testosterone  He is advised to follow-up with Dr Renetta Singleton, the urologist and will obtain a clean-catch urinalysis           Patient Care Team:  Santiago Snell MD as PCP - Mark Slaughter, MD Akosua James MD Lyndel Amato, MD     Review of Systems:     Review of Systems   Constitutional: Negative for chills, fatigue and fever  HENT: Negative for ear pain and sore throat  Eyes: Negative for pain and visual disturbance  Respiratory: Negative for cough, chest tightness and shortness of breath  Cardiovascular: Negative for chest pain and palpitations  Gastrointestinal: Negative for abdominal pain and vomiting  Genitourinary: Positive for decreased urine volume (following bike rides) and frequency  Negative for dysuria, hematuria, penile discharge and testicular pain  Musculoskeletal: Negative for arthralgias and back pain  Skin: Negative for color change and rash  Neurological: Negative for seizures, syncope, weakness and headaches  All other systems reviewed and are negative         Problem List:     Patient Active Problem List   Diagnosis   • Primary osteoarthritis of left knee   • Lumbar facet arthropathy   • Anxiety   • Chronic bilateral thoracic back pain   • Fatigue   • Secondary polycythemia   • Chronic pain disorder   • Failed back syndrome   • Testicular hypogonadism   • Impaired fasting glucose   • Screening for skin condition   • Abscess of skin of abdomen   • Chronic pain syndrome   • Lumbar radiculopathy   • S/P insertion of intrathecal pump   • Status post insertion of spinal cord stimulator   • Spinal cord stimulator dysfunction (HCC)   • Radiculopathy, cervical   • End of battery life of intrathecal infusion pump   • Gastroesophageal reflux disease without esophagitis   • Continuous opioid dependence (HCC)   • Elevated PSA   • Symptom of bladder outlet obstruction      Past Medical and Surgical History:     Past Medical History:   Diagnosis Date   • Acute delirium     history of post op delirium   • Anxiety with Persistent Worry about Recurrent Panic Attacks    • Broken arm 12/21/2021    Broke his (L) arm when he slipped on the ice while going to his car    • Depression    • GERD (gastroesophageal reflux disease)     controlled with medications   • History of Cindy-Barr virus infection    • Osteoarthritis    • Spinal cord stimulator dysfunction [facility])      Past Surgical History:   Procedure Laterality Date   • IMPLANTATION / [employer] CATHETER  2007    Intrathecal  Dr Sahil Arambula   • JOINT REPLACEMENT Bilateral    • OTHER SURGICAL HISTORY  04/11/2014    SF: Replacement of intrathecal pain pump reservoir w/programming   • CT IMPLTJ/RPLCMT ITHCL/EDRL DRUG NFS SUBQ RSVR Right 11/20/2020    Procedure: REPLACEMENT INTRATHECAL PAIN PUMP, RIGHT;  Surgeon: Christine Ruiz MD;  Location:  MAIN OR;  Service: Neurosurgery   • REPLACEMENT TOTAL KNEE Right 2009    Pending sale to [facility]/Dr Ofelia Aguilar   • REPLACEMENT TOTAL KNEE Left 06/22/2018   • TONSILLECTOMY        Family History:     Family History   Problem Relation Age of Onset   • Lupus Mother    • Heart failure Mother    • Heart failure Father    • Kidney failure Father         Acute   • Heart disease Family    • Neuropathy Family         Peripheral      Social History:     Social History     Socioeconomic History   • Marital status: /Civil Union     Spouse name: None   • Number of children: None   • Years of education: None   • Highest education level: None   Occupational History   • None   Tobacco Use   • Smoking status: Never   • Smokeless tobacco: Never   Vaping Use   • Vaping Use: Never used   Substance and Sexual Activity   • Alcohol use: No   • Drug use: No   • Sexual activity: Yes     Partners: Female   Other Topics Concern   • None   Social History Narrative   • None     Social Determinants of Health     Financial Resource Strain: Low Risk    • Difficulty of Paying Living Expenses: Not hard at all   Food Insecurity: Not on file   Transportation Needs: No Transportation Needs   • Lack of Transportation (Medical): No   • Lack of Transportation (Non-Medical):  No   Physical Activity: Not on file   Stress: Not on file   Social Connections: Not on file   Intimate Partner Violence: Not on file   Housing Stability: Not on file      Medications and Allergies:     Current Outpatient Medications   Medication Sig Dispense Refill   • amphetamine-dextroamphetamine (ADDERALL XR) 5 MG 24 hr capsule Take 5 mg by mouth every morning     • BD ECLIPSE SYRINGE 23G X 1" 3 ML MISC As directed  12   • buPROPion (WELLBUTRIN SR) 150 mg 12 hr tablet Take 450 mg by mouth daily      • cholecalciferol (VITAMIN D3) 1,000 units tablet Take 1,000 Units by mouth as needed      • dicyclomine (BENTYL) 20 mg tablet dicyclomine 20 mg tablet   TAKE ONE TABLET BY MOUTH FOUR TIMES DAILY     • diphenhydrAMINE (BENADRYL) 25 mg tablet Take 25 mg by mouth daily at bedtime as needed for sleep     • famotidine (PEPCID) 40 MG tablet 40 mg as needed      • ibuprofen (MOTRIN) 200 mg tablet 800 mg       • oxyCODONE (OxyCONTIN) 80 mg 12 hr tablet Take 40 mg by mouth every 12 (twelve) hours      • oxyCODONE (ROXICODONE) 30 MG immediate release tablet Take 30 mg by mouth 2 (two) times a day      • sildenafil (VIAGRA) 100 mg tablet Take 100 mg by mouth as needed • tamsulosin (FLOMAX) 0 4 mg Take 1 capsule (0 4 mg total) by mouth daily with dinner 30 capsule 2   • testosterone cypionate (DEPO-TESTOSTERONE) 200 mg/mL SOLN Inject into a muscle Takes every 4 days     • tiZANidine (ZANAFLEX) 4 mg tablet Take 12 mg by mouth every 4 (four) hours as needed for muscle spasms     • amphetamine-dextroamphetamine (ADDERALL XR) 10 MG 24 hr capsule      • ascorbic acid (VITAMIN C) 500 mg tablet Take 500 mg by mouth every other day  (Patient not taking: Reported on 1/18/2023)     • aspirin (ECOTRIN LOW STRENGTH) 81 mg EC tablet Take 1 tablet by mouth daily (Patient not taking: Reported on 1/18/2023)     • Flowflex COVID-19 Ag Home Test KIT Use as directed     • Omega-3 Fatty Acids (fish oil) 1,000 mg Take 1 capsule (1,000 mg total) by mouth daily (Patient not taking: Reported on 1/18/2023) 90 capsule 3   • omega-3-acid ethyl esters (LOVAZA) 1 g capsule Take 1 capsule (1,000 mg total) by mouth daily (Patient not taking: Reported on 1/18/2023)       No current facility-administered medications for this visit       Allergies   Allergen Reactions   • Molds & Smuts Swelling and Other (See Comments)     Other reaction(s): watery, itchy eyes   • Nuts - Food Allergy Anaphylaxis   • Pregabalin Edema     lyrica    • Cat Hair Extract Itching and Other (See Comments)     Other reaction(s): watery, itchy eyes  Dog hair, dog dander   • Dust Mite Extract Other (See Comments)     Other reaction(s): watery, itchy eyes   • Other      Pine nuts        Immunizations:     Immunization History   Administered Date(s) Administered   • COVID-19 PFIZER VACCINE 0 3 ML IM 03/05/2021, 03/26/2021, 09/24/2021   • COVID-19 Pfizer vac (Steve-sucrose, gray cap) 12 yr+ IM 04/22/2022   • INFLUENZA 12/19/2014, 11/21/2022   • Influenza Quadrivalent, 6-35 Months IM 12/04/2015, 10/20/2017   • Influenza, high dose seasonal 0 7 mL 10/13/2020, 10/08/2021   • Influenza, recombinant, quadrivalent,injectable, preservative free 11/16/2018   • Pneumococcal Polysaccharide PPV23 12/27/2021   • Tdap 1955   • Zoster Vaccine Recombinant 02/25/2022      Health Maintenance:         Topic Date Due   • Colorectal Cancer Screening  02/15/2025   • Hepatitis C Screening  Completed         Topic Date Due   • COVID-19 Vaccine (5 - Booster for Pfizer series) 06/17/2022   • Pneumococcal Vaccine: 65+ Years (2 - PCV) 12/27/2022      Medicare Screening Tests and Risk Assessments:     Ed Buerger is here for his Subsequent Wellness visit  Health Risk Assessment:   Patient rates overall health as very good  Patient feels that their physical health rating is much better  Patient is very satisfied with their life  Eyesight was rated as same  Hearing was rated as same  Patient feels that their emotional and mental health rating is slightly better  Patients states they are never, rarely angry  Patient states they are never, rarely unusually tired/fatigued  Pain experienced in the last 7 days has been a lot  Patient's pain rating has been 2/10  Patient states that he has experienced no weight loss or gain in last 6 months  Depression Screening:   PHQ-2 Score: 0      Fall Risk Screening: In the past year, patient has experienced: history of falling in past year    Number of falls: 1  Injured during fall?: Yes    Feels unsteady when standing or walking?: No    Worried about falling?: No      Home Safety:  Patient does not have trouble with stairs inside or outside of their home  Patient has working smoke alarms and has working carbon monoxide detector  Home safety hazards include: none  Nutrition:   Current diet is Regular  Medications:   Patient is currently taking over-the-counter supplements  OTC medications include: see medication list  Patient is able to manage medications       Activities of Daily Living (ADLs)/Instrumental Activities of Daily Living (IADLs):   Walk and transfer into and out of bed and chair?: Yes  Dress and groom yourself?: Yes Bathe or shower yourself?: Yes    Feed yourself? Yes  Do your laundry/housekeeping?: Yes  Manage your money, pay your bills and track your expenses?: Yes  Make your own meals?: Yes    Do your own shopping?: Yes    Previous Hospitalizations:   Any hospitalizations or ED visits within the last 12 months?: No      Advance Care Planning:   Living will: Yes    Advanced directive: Yes      PREVENTIVE SCREENINGS      Cardiovascular Screening:    General: Screening Current      Diabetes Screening:     General: Screening Current      Colorectal Cancer Screening:     General: Screening Current      Prostate Cancer Screening:    General: Screening Current      Abdominal Aortic Aneurysm (AAA) Screening:    Risk factors include: age between 73-67 yo        Lung Cancer Screening:     General: Screening Not Indicated      Hepatitis C Screening:    General: Screening Current    Screening, Brief Intervention, and Referral to Treatment (SBIRT)    Screening      Single Item Drug Screening:  How often have you used an illegal drug (including marijuana) or a prescription medication for non-medical reasons in the past year? never    Single Item Drug Screen Score: 0  Interpretation: Negative screen for possible drug use disorder    No results found  Physical Exam:     /80 (BP Location: Left arm, Patient Position: Sitting, Cuff Size: Standard)   Pulse 80   Temp (!) 97 °F (36 1 °C) (Tympanic)   Resp 16   Ht 5' 11" (1 803 m)   Wt 85 7 kg (189 lb)   SpO2 99%   BMI 26 36 kg/m²     Physical Exam  Vitals and nursing note reviewed  Constitutional:       General: He is not in acute distress  Appearance: He is well-developed  He is not toxic-appearing  HENT:      Head: Normocephalic and atraumatic  Eyes:      Conjunctiva/sclera: Conjunctivae normal    Cardiovascular:      Rate and Rhythm: Normal rate and regular rhythm  Pulses: Normal pulses  Heart sounds: No murmur heard    Pulmonary:      Effort: Pulmonary effort is normal  No respiratory distress  Breath sounds: Normal breath sounds  Abdominal:      General: Bowel sounds are normal  There is no distension  Palpations: Abdomen is soft  Tenderness: There is no abdominal tenderness  Musculoskeletal:         General: No swelling  Cervical back: Neck supple  Right lower leg: No edema  Left lower leg: No edema  Skin:     General: Skin is warm and dry  Capillary Refill: Capillary refill takes less than 2 seconds  Neurological:      Mental Status: He is alert and oriented to person, place, and time  Mental status is at baseline     Psychiatric:         Mood and Affect: Mood normal           Santiago Snell MD

## 2023-01-19 LAB
BILIRUB UR QL STRIP: NEGATIVE
CLARITY UR: CLEAR
COLOR UR: NORMAL
GLUCOSE UR STRIP-MCNC: NEGATIVE MG/DL
HGB UR QL STRIP.AUTO: NEGATIVE
KETONES UR STRIP-MCNC: NEGATIVE MG/DL
LEUKOCYTE ESTERASE UR QL STRIP: NEGATIVE
NITRITE UR QL STRIP: NEGATIVE
PH UR STRIP.AUTO: 7.5 [PH]
PROT UR STRIP-MCNC: NEGATIVE MG/DL
PSA SERPL-MCNC: 4.9 NG/ML (ref 0–4)
SP GR UR STRIP.AUTO: 1.01 (ref 1–1.03)
TESTOST FREE SERPL-MCNC: 10.6 PG/ML (ref 6.6–18.1)
TESTOST SERPL-MCNC: 1225 NG/DL (ref 264–916)
UROBILINOGEN UR STRIP-ACNC: <2 MG/DL

## 2023-11-18 ENCOUNTER — DOCUMENTATION (OUTPATIENT)
Age: 68
End: 2023-11-18

## 2023-11-18 ENCOUNTER — NURSE TRIAGE (OUTPATIENT)
Dept: OTHER | Facility: OTHER | Age: 68
End: 2023-11-18

## 2023-11-18 DIAGNOSIS — M54.9 UPPER BACK PAIN: ICD-10-CM

## 2023-11-18 DIAGNOSIS — M54.9 UPPER BACK PAIN: Primary | ICD-10-CM

## 2023-11-18 RX ORDER — ORPHENADRINE CITRATE 100 MG/1
100 TABLET, EXTENDED RELEASE ORAL 2 TIMES DAILY
Qty: 14 TABLET | Refills: 0 | Status: SHIPPED | OUTPATIENT
Start: 2023-11-18 | End: 2023-11-25

## 2023-11-18 RX ORDER — ORPHENADRINE CITRATE 100 MG/1
100 TABLET, EXTENDED RELEASE ORAL 2 TIMES DAILY
Qty: 14 TABLET | Refills: 0 | Status: SHIPPED | OUTPATIENT
Start: 2023-11-18 | End: 2023-11-18 | Stop reason: SDUPTHER

## 2023-11-18 NOTE — TELEPHONE ENCOUNTER
Answer Assessment - Initial Assessment Questions  1. ONSET: "When did the pain begin?"       Today  2. LOCATION: "Where does it hurt?" (upper, mid or lower back)      Upper back  3. SEVERITY: "How bad is the pain?"  (e.g., Scale 1-10; mild, moderate, or severe)    - MILD (1-3): doesn't interfere with normal activities     - MODERATE (4-7): interferes with normal activities or awakens from sleep     - SEVERE (8-10): excruciating pain, unable to do any normal activities       7-8/10  4. PATTERN: "Is the pain constant?" (e.g., yes, no; constant, intermittent)       Unspecified  5. RADIATION: "Does the pain shoot into your legs or elsewhere?"      Some groin pain as well unsure if related  6. CAUSE:  "What do you think is causing the back pain?"       Pulled muscle  7. BACK OVERUSE:  "Any recent lifting of heavy objects, strenuous work or exercise?"      Yes- wife states he pulled muscle while basting turkey  8. MEDICATIONS: "What have you taken so far for the pain?" (e.g., nothing, acetaminophen, NSAIDS)      Orphenadrine and Ibuprofen  9. NEUROLOGIC SYMPTOMS: "Do you have any weakness, numbness, or problems with bowel/bladder control?"      Denies neurological symptoms  10. OTHER SYMPTOMS: "Do you have any other symptoms?" (e.g., fever, abdominal pain, burning with urination, blood in urine)        Denies other symptoms. Protocols used: Back Pain-ADULT-AH    Pt's wife calling. Explains that pt pulled muscle in upper back recently. She is requesting script for Orphenadrine as he used this the last time and it was helpful. Explained that pt may need to be seen before prescriptions sent. She states she is a neurologist and has examined him and would like script sent. Paged on call provider. Per on call, would first recommend heat, NSAIDS, and massage before muscle relaxer. But will send over short course of Orphenadrine until he can be seen in the office. Called pt's spouse and made her aware.

## 2023-11-18 NOTE — TELEPHONE ENCOUNTER
Regarding: Pulled muscle  ----- Message from Genevieve Barnes sent at 11/18/2023  3:11 PM EST -----  " Jesusita wife called in to request a muscle relaxer and would like a script for  Orphenadrine to be sent to :   Pt pulled a muscle while to some activities     [facility]  [address], [address] [facility]  [phone]

## 2023-11-18 NOTE — TELEPHONE ENCOUNTER
Reason for Disposition  • Caused by a twisting, bending, or lifting injury    Protocols used: Back Pain-ADULT-AH

## 2024-01-15 DIAGNOSIS — R11.0 NAUSEA: Primary | ICD-10-CM

## 2024-01-15 RX ORDER — SCOLOPAMINE TRANSDERMAL SYSTEM 1 MG/1
1 PATCH, EXTENDED RELEASE TRANSDERMAL
COMMUNITY
End: 2024-01-15 | Stop reason: SDUPTHER

## 2024-01-15 RX ORDER — SCOLOPAMINE TRANSDERMAL SYSTEM 1 MG/1
1 PATCH, EXTENDED RELEASE TRANSDERMAL
Qty: 4 PATCH | Refills: 1 | Status: SHIPPED | OUTPATIENT
Start: 2024-01-15

## 2024-02-21 PROBLEM — Z13.89 SCREENING FOR SKIN CONDITION: Status: RESOLVED | Noted: 2019-01-16 | Resolved: 2024-02-21

## 2024-03-04 ENCOUNTER — TELEPHONE (OUTPATIENT)
Age: 69
End: 2024-03-04

## 2024-03-04 DIAGNOSIS — M54.2 NECK PAIN: Primary | ICD-10-CM

## 2024-03-04 NOTE — TELEPHONE ENCOUNTER
Are you OK placing PT referral for him?  
E-mailed to Vandana   
Wife LVDARRIN requesting PT referral for neck pain    Hey thought Jesusita calling here. I'm calling for my  Maximo Ar[facility]. Date of birth [birth year]. Maximo has chronic neck problems. Doctor Rogerio was aware about it, but he's had a recent flare up and wanted to try physical therapy to see if it would help. He needs They don't referral to [facility] and [address] If you could get that to me, my e-mail is that Clifofrd [email or web address]. I would like to appreciate it and I hope you have a great day Thank you. That's good. I can get my instruction.    
07:15

## 2024-03-14 ENCOUNTER — TELEPHONE (OUTPATIENT)
Age: 69
End: 2024-03-14

## 2024-03-14 DIAGNOSIS — R11.0 NAUSEA: ICD-10-CM

## 2024-03-14 DIAGNOSIS — N32.0 SYMPTOM OF BLADDER OUTLET OBSTRUCTION: ICD-10-CM

## 2024-03-14 RX ORDER — TAMSULOSIN HYDROCHLORIDE 0.4 MG/1
0.4 CAPSULE ORAL
Qty: 90 CAPSULE | Refills: 3 | Status: SHIPPED | OUTPATIENT
Start: 2024-03-14 | End: 2024-06-12

## 2024-03-14 RX ORDER — SCOLOPAMINE TRANSDERMAL SYSTEM 1 MG/1
1 PATCH, EXTENDED RELEASE TRANSDERMAL
Qty: 5 PATCH | Refills: 0 | Status: SHIPPED | OUTPATIENT
Start: 2024-03-14

## 2024-03-20 PROBLEM — Z98.890 POSTPROCEDURAL STATE: Status: RESOLVED | Noted: 2024-03-20 | Resolved: 2024-03-20

## 2024-03-20 PROBLEM — Z01.818 PRE-OPERATIVE EXAMINATION: Status: RESOLVED | Noted: 2018-05-18 | Resolved: 2024-03-20

## 2024-03-20 PROBLEM — L02.211 ABSCESS OF SKIN OF ABDOMEN: Status: RESOLVED | Noted: 2019-01-16 | Resolved: 2024-03-20

## 2024-03-20 PROBLEM — M79.601 PAIN IN RIGHT ARM: Status: RESOLVED | Noted: 2017-02-07 | Resolved: 2024-03-20

## 2024-03-20 PROBLEM — J01.90 ACUTE SINUSITIS: Status: RESOLVED | Noted: 2017-01-17 | Resolved: 2024-03-20

## 2024-03-20 PROBLEM — S52.609A CLOSED FRACTURE OF LOWER END OF RADIUS AND ULNA: Status: RESOLVED | Noted: 2021-12-28 | Resolved: 2024-03-20

## 2024-03-20 PROBLEM — M25.532 LEFT WRIST PAIN: Status: RESOLVED | Noted: 2023-07-24 | Resolved: 2024-03-20

## 2024-03-20 PROBLEM — M79.643 HAND PAIN: Status: RESOLVED | Noted: 2018-01-07 | Resolved: 2024-03-20

## 2024-03-20 PROBLEM — B34.9 VIRAL INFECTION: Status: RESOLVED | Noted: 2017-01-17 | Resolved: 2024-03-20

## 2024-03-20 PROBLEM — M17.12 PRIMARY OSTEOARTHRITIS OF LEFT KNEE: Status: RESOLVED | Noted: 2018-05-18 | Resolved: 2024-03-20

## 2024-03-20 PROBLEM — M67.919 DISORDER OF ROTATOR CUFF: Status: RESOLVED | Noted: 2017-02-07 | Resolved: 2024-03-20

## 2024-03-20 PROBLEM — Z09 SURGICAL FOLLOW-UP CARE: Status: RESOLVED | Noted: 2024-03-20 | Resolved: 2024-03-20

## 2024-03-20 PROBLEM — N52.9 ERECTILE DYSFUNCTION: Status: ACTIVE | Noted: 2023-12-15

## 2024-03-20 PROBLEM — G89.4 CHRONIC PAIN DISORDER: Status: RESOLVED | Noted: 2017-04-19 | Resolved: 2024-03-20

## 2024-03-20 PROBLEM — M25.519 SHOULDER PAIN: Status: RESOLVED | Noted: 2017-02-07 | Resolved: 2024-03-20

## 2024-03-20 PROBLEM — R39.11 BENIGN PROSTATIC HYPERPLASIA WITH URINARY HESITANCY: Status: ACTIVE | Noted: 2023-12-15

## 2024-03-20 PROBLEM — N40.1 BENIGN PROSTATIC HYPERPLASIA WITH URINARY HESITANCY: Status: RESOLVED | Noted: 2023-12-15 | Resolved: 2024-03-20

## 2024-03-20 PROBLEM — Z23 FLU VACCINE NEED: Status: RESOLVED | Noted: 2024-03-20 | Resolved: 2024-03-20

## 2024-03-20 PROBLEM — N40.1 BENIGN PROSTATIC HYPERPLASIA WITH URINARY HESITANCY: Status: ACTIVE | Noted: 2023-12-15

## 2024-03-20 PROBLEM — R73.01 IMPAIRED FASTING GLUCOSE: Status: RESOLVED | Noted: 2018-12-06 | Resolved: 2024-03-20

## 2024-03-20 PROBLEM — S52.509A CLOSED FRACTURE OF LOWER END OF RADIUS AND ULNA: Status: RESOLVED | Noted: 2021-12-28 | Resolved: 2024-03-20

## 2024-03-20 PROBLEM — Z23 NEED FOR VACCINATION: Status: RESOLVED | Noted: 2024-03-20 | Resolved: 2024-03-20

## 2024-03-20 PROBLEM — R39.11 BENIGN PROSTATIC HYPERPLASIA WITH URINARY HESITANCY: Status: RESOLVED | Noted: 2023-12-15 | Resolved: 2024-03-20

## 2024-06-24 ENCOUNTER — RA CDI HCC (OUTPATIENT)
Dept: OTHER | Facility: HOSPITAL | Age: 69
End: 2024-06-24

## 2025-04-17 NOTE — TELEPHONE ENCOUNTER
02/02/22 1:55 PM     See documentation in the [employer] SmartForm       Brenda Scott MA normal appearance

## 2025-05-20 ENCOUNTER — VBI (OUTPATIENT)
Dept: ADMINISTRATIVE | Facility: OTHER | Age: 70
End: 2025-05-20

## (undated) DEVICE — DRAPE C-ARM X-RAY

## (undated) DEVICE — PAD GROUNDING ADULT

## (undated) DEVICE — NEEDLE HYPO 22G X 1-1/2 IN

## (undated) DEVICE — BETHLEHEM UNIVERSAL MINOR GEN: Brand: CARDINAL HEALTH

## (undated) DEVICE — 3M™ IOBAN™ 2 ANTIMICROBIAL INCISE DRAPE 6650EZ: Brand: IOBAN™ 2

## (undated) DEVICE — VIAL DECANTER

## (undated) DEVICE — GLOVE INDICATOR PI UNDERGLOVE SZ 6.5 BLUE

## (undated) DEVICE — TIBURON SPLIT SHEET: Brand: CONVERTORS

## (undated) DEVICE — SCD SEQUENTIAL COMPRESSION COMFORT SLEEVE MEDIUM KNEE LENGTH: Brand: KENDALL SCD

## (undated) DEVICE — BULB SYRINGE,IRRIGATION WITH PROTECTIVE CAP: Brand: DOVER

## (undated) DEVICE — TUBING SUCTION 5MM X 12 FT

## (undated) DEVICE — 10FR FRAZIER SUCTION HANDLE: Brand: CARDINAL HEALTH

## (undated) DEVICE — GLOVE SRG BIOGEL 6.5

## (undated) DEVICE — SYRINGE 3ML LL

## (undated) DEVICE — PENCIL ELECTROSURG E-Z CLEAN -0035H

## (undated) DEVICE — GLOVE INDICATOR PI UNDERGLOVE SZ 7.5 BLUE

## (undated) DEVICE — SYRINGE 20ML LL

## (undated) DEVICE — ADHESIVE SKIN HIGH VISCOSITY EXOFIN 1ML

## (undated) DEVICE — SPONGE SCRUB 4 PCT CHLORHEXIDINE

## (undated) DEVICE — SUT SILK 2 60 IN SA8H

## (undated) DEVICE — INTENDED FOR TISSUE SEPARATION, AND OTHER PROCEDURES THAT REQUIRE A SHARP SURGICAL BLADE TO PUNCTURE OR CUT.: Brand: BARD-PARKER SAFETY BLADES SIZE 11, STERILE

## (undated) DEVICE — PREP SURGICAL PURPREP 26ML

## (undated) DEVICE — SUT MONOCRYL 4-0 PS-2 27 IN Y426H

## (undated) DEVICE — ELECTRODE BLADE MOD E-Z CLEAN  2.75IN 7CM -0012AM

## (undated) DEVICE — ANTIBACTERIAL VIOLET BRAIDED (POLYGLACTIN 910), SYNTHETIC ABSORBABLE SUTURE: Brand: COATED VICRYL

## (undated) DEVICE — GLOVE SRG BIOGEL ECLIPSE 7